# Patient Record
Sex: MALE | Race: WHITE | NOT HISPANIC OR LATINO | Employment: UNEMPLOYED | URBAN - METROPOLITAN AREA
[De-identification: names, ages, dates, MRNs, and addresses within clinical notes are randomized per-mention and may not be internally consistent; named-entity substitution may affect disease eponyms.]

---
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
## 2022-01-03 PROBLEM — R45.851 SUICIDAL IDEATION: Status: ACTIVE | Noted: 2022-01-03

## 2022-01-03 PROBLEM — G40.901 STATUS EPILEPTICUS: Status: ACTIVE | Noted: 2022-01-03

## 2022-01-03 PROBLEM — L81.8 TATTOO OF SKIN: Status: ACTIVE | Noted: 2022-01-03

## 2022-01-03 PROBLEM — G93.40 ACUTE ENCEPHALOPATHY: Status: ACTIVE | Noted: 2022-01-03

## 2022-01-03 PROBLEM — G40.909 SEIZURE DISORDER: Status: ACTIVE | Noted: 2022-01-03

## 2022-01-03 PROBLEM — R06.03 RESPIRATORY DISTRESS: Status: ACTIVE | Noted: 2022-01-03

## 2022-01-03 PROBLEM — D64.9 NORMOCYTIC ANEMIA: Status: ACTIVE | Noted: 2022-01-03

## 2022-01-05 PROBLEM — F44.5 PSEUDOSEIZURES: Status: ACTIVE | Noted: 2022-01-05

## 2022-02-21 PROBLEM — Z13.9 ENCOUNTER FOR MEDICAL SCREENING EXAMINATION: Status: RESOLVED | Noted: 2021-11-21 | Resolved: 2022-02-21

## 2022-02-23 ENCOUNTER — HOSPITAL ENCOUNTER (EMERGENCY)
Facility: HOSPITAL | Age: 22
Discharge: HOME | End: 2022-02-24
Attending: EMERGENCY MEDICINE
Payer: MEDICAID

## 2022-02-23 DIAGNOSIS — F12.10 MARIJUANA ABUSE: ICD-10-CM

## 2022-02-23 DIAGNOSIS — R56.9 SEIZURE: Primary | ICD-10-CM

## 2022-02-23 DIAGNOSIS — Z91.199 NON-COMPLIANCE: ICD-10-CM

## 2022-02-23 DIAGNOSIS — F14.10 COCAINE ABUSE: ICD-10-CM

## 2022-02-23 LAB
ALBUMIN SERPL BCP-MCNC: 4.1 G/DL (ref 3.5–5.2)
ALP SERPL-CCNC: 87 U/L (ref 55–135)
ALT SERPL W/O P-5'-P-CCNC: 6 U/L (ref 10–44)
AMPHET+METHAMPHET UR QL: NEGATIVE
ANION GAP SERPL CALC-SCNC: 12 MMOL/L (ref 8–16)
AST SERPL-CCNC: 15 U/L (ref 10–40)
BARBITURATES UR QL SCN>200 NG/ML: NEGATIVE
BASOPHILS # BLD AUTO: 0.01 K/UL (ref 0–0.2)
BASOPHILS NFR BLD: 0.2 % (ref 0–1.9)
BENZODIAZ UR QL SCN>200 NG/ML: NEGATIVE
BILIRUB SERPL-MCNC: 0.5 MG/DL (ref 0.1–1)
BUN SERPL-MCNC: 11 MG/DL (ref 6–20)
BZE UR QL SCN: ABNORMAL
CALCIUM SERPL-MCNC: 9.5 MG/DL (ref 8.7–10.5)
CANNABINOIDS UR QL SCN: ABNORMAL
CHLORIDE SERPL-SCNC: 106 MMOL/L (ref 95–110)
CO2 SERPL-SCNC: 23 MMOL/L (ref 23–29)
CREAT SERPL-MCNC: 0.9 MG/DL (ref 0.5–1.4)
CREAT UR-MCNC: 219.2 MG/DL (ref 23–375)
DIFFERENTIAL METHOD: NORMAL
EOSINOPHIL # BLD AUTO: 0.2 K/UL (ref 0–0.5)
EOSINOPHIL NFR BLD: 2.6 % (ref 0–8)
ERYTHROCYTE [DISTWIDTH] IN BLOOD BY AUTOMATED COUNT: 12 % (ref 11.5–14.5)
EST. GFR  (AFRICAN AMERICAN): >60 ML/MIN/1.73 M^2
EST. GFR  (NON AFRICAN AMERICAN): >60 ML/MIN/1.73 M^2
GLUCOSE SERPL-MCNC: 82 MG/DL (ref 70–110)
HCT VFR BLD AUTO: 41.9 % (ref 40–54)
HGB BLD-MCNC: 14.3 G/DL (ref 14–18)
IMM GRANULOCYTES # BLD AUTO: 0.01 K/UL (ref 0–0.04)
IMM GRANULOCYTES NFR BLD AUTO: 0.2 % (ref 0–0.5)
LYMPHOCYTES # BLD AUTO: 2.2 K/UL (ref 1–4.8)
LYMPHOCYTES NFR BLD: 38.1 % (ref 18–48)
MCH RBC QN AUTO: 29.4 PG (ref 27–31)
MCHC RBC AUTO-ENTMCNC: 34.1 G/DL (ref 32–36)
MCV RBC AUTO: 86 FL (ref 82–98)
METHADONE UR QL SCN>300 NG/ML: NEGATIVE
MONOCYTES # BLD AUTO: 0.5 K/UL (ref 0.3–1)
MONOCYTES NFR BLD: 8.9 % (ref 4–15)
NEUTROPHILS # BLD AUTO: 2.9 K/UL (ref 1.8–7.7)
NEUTROPHILS NFR BLD: 50 % (ref 38–73)
NRBC BLD-RTO: 0 /100 WBC
OPIATES UR QL SCN: NEGATIVE
PCP UR QL SCN>25 NG/ML: NEGATIVE
PLATELET # BLD AUTO: 216 K/UL (ref 150–450)
PMV BLD AUTO: 10 FL (ref 9.2–12.9)
POTASSIUM SERPL-SCNC: 4.2 MMOL/L (ref 3.5–5.1)
PROT SERPL-MCNC: 7 G/DL (ref 6–8.4)
RBC # BLD AUTO: 4.86 M/UL (ref 4.6–6.2)
SODIUM SERPL-SCNC: 141 MMOL/L (ref 136–145)
TOXICOLOGY INFORMATION: ABNORMAL
WBC # BLD AUTO: 5.85 K/UL (ref 3.9–12.7)

## 2022-02-23 PROCEDURE — 96374 THER/PROPH/DIAG INJ IV PUSH: CPT

## 2022-02-23 PROCEDURE — 80307 DRUG TEST PRSMV CHEM ANLYZR: CPT | Performed by: NURSE PRACTITIONER

## 2022-02-23 PROCEDURE — 25000003 PHARM REV CODE 250: Performed by: EMERGENCY MEDICINE

## 2022-02-23 PROCEDURE — 99284 EMERGENCY DEPT VISIT MOD MDM: CPT | Mod: 25

## 2022-02-23 PROCEDURE — 63600175 PHARM REV CODE 636 W HCPCS: Performed by: EMERGENCY MEDICINE

## 2022-02-23 PROCEDURE — 85025 COMPLETE CBC W/AUTO DIFF WBC: CPT | Performed by: NURSE PRACTITIONER

## 2022-02-23 PROCEDURE — 80053 COMPREHEN METABOLIC PANEL: CPT | Performed by: NURSE PRACTITIONER

## 2022-02-23 RX ORDER — LORAZEPAM 2 MG/ML
1 INJECTION INTRAMUSCULAR
Status: COMPLETED | OUTPATIENT
Start: 2022-02-23 | End: 2022-02-23

## 2022-02-23 RX ORDER — OXCARBAZEPINE 150 MG/1
300 TABLET, FILM COATED ORAL
Status: COMPLETED | OUTPATIENT
Start: 2022-02-23 | End: 2022-02-23

## 2022-02-23 RX ORDER — LACOSAMIDE 100 MG/1
100 TABLET ORAL EVERY 12 HOURS
Qty: 60 TABLET | Refills: 1 | Status: ON HOLD | OUTPATIENT
Start: 2022-02-23 | End: 2022-10-24 | Stop reason: HOSPADM

## 2022-02-23 RX ORDER — OXCARBAZEPINE 300 MG/1
300 TABLET, FILM COATED ORAL 2 TIMES DAILY
Qty: 60 TABLET | Refills: 1 | Status: ON HOLD | OUTPATIENT
Start: 2022-02-23 | End: 2022-10-24 | Stop reason: SDUPTHER

## 2022-02-23 RX ORDER — LACOSAMIDE 50 MG/1
100 TABLET ORAL
Status: COMPLETED | OUTPATIENT
Start: 2022-02-23 | End: 2022-02-23

## 2022-02-23 RX ADMIN — OXCARBAZEPINE 300 MG: 150 TABLET, FILM COATED ORAL at 07:02

## 2022-02-23 RX ADMIN — LACOSAMIDE 100 MG: 50 TABLET, FILM COATED ORAL at 07:02

## 2022-02-23 RX ADMIN — LORAZEPAM 1 MG: 2 INJECTION INTRAMUSCULAR; INTRAVENOUS at 07:02

## 2022-02-24 VITALS
SYSTOLIC BLOOD PRESSURE: 128 MMHG | BODY MASS INDEX: 21.8 KG/M2 | OXYGEN SATURATION: 96 % | DIASTOLIC BLOOD PRESSURE: 78 MMHG | HEART RATE: 70 BPM | TEMPERATURE: 98 F | HEIGHT: 72 IN | RESPIRATION RATE: 20 BRPM

## 2022-02-24 NOTE — ED PROVIDER NOTES
SCRIBE #1 NOTE: I, Kristopher Das, am scribing for, and in the presence of, Stanley Etienne Jr., MD. I have scribed the entire note.       History     Chief Complaint   Patient presents with    Seizures     Patient states he had hx seizures but has not been taking his meds for an unknown amount of time. States he had a seizure tonight but unsure of details - was dropped off. C/o headache and nausea. Says last time he had a seizure he was intubated.      Review of patient's allergies indicates:   Allergen Reactions    D and c red no.7 Rash    [place] Rash         History of Present Illness     HPI    2/23/2022, 7:30 PM  History obtained from the patient      History of Present Illness: Jerardo Middleton is a 21 y.o. male patient with a PMHx of seizures and drug abuse who presents to the Emergency Department for evaluation of seizure which onset suddenly tonight. Pt admits to being out of his seizure medications for an unknown amount of time. Symptoms are intermittent and moderate in severity. No mitigating or exacerbating factors reported. Associated sxs include fatigue, HA, and nausea. Patient denies any V/D, dizziness, light-headedness, numbness, weakness, SOB, and all other sxs at this time. No prior Tx reported. No further complaints or concerns at this time.       Arrival mode: Personal vehicle    PCP: Primary Doctor No        Past Medical History:  Past Medical History:   Diagnosis Date    Asthma     Bipolar disorder     Colitis     Drug abuse     Amphetamines and cannabis    Fetal alcohol syndrome     Foster child 2020    Impulse control disorder        Past Surgical History:  No past surgical history on file.      Family History:  No family history on file.    Social History:  Social History     Tobacco Use    Smoking status: Never Smoker    Smokeless tobacco: Never Used   Substance and Sexual Activity    Alcohol use: No    Drug use: Never    Sexual activity: Yes     Partners: Female         Review of Systems     Review of Systems   Constitutional: Positive for fatigue. Negative for fever.   HENT: Negative for sore throat.    Respiratory: Negative for shortness of breath.    Cardiovascular: Negative for chest pain.   Gastrointestinal: Positive for nausea. Negative for diarrhea and vomiting.   Genitourinary: Negative for dysuria.   Musculoskeletal: Negative for back pain.   Skin: Negative for rash.   Neurological: Positive for seizures and headaches. Negative for dizziness, weakness, light-headedness and numbness.   Hematological: Does not bruise/bleed easily.   All other systems reviewed and are negative.       Physical Exam     Initial Vitals [02/23/22 1919]   BP Pulse Resp Temp SpO2   124/89 85 (!) 22 97.9 °F (36.6 °C) 98 %      MAP       --          Physical Exam  Nursing Notes and Vital Signs Reviewed.    Constitutional: Patient is in no acute distress. Well-developed and well-nourished.  Head: Atraumatic. Normocephalic.  Eyes:  EOM intact.  No scleral icterus.  ENT: Mucous membranes are moist.  Nares clear   Neck:  Full ROM. No JVD.  Cardiovascular: Regular rate. Regular rhythm No murmurs, rubs, or gallops. Distal pulses are 2+ and symmetric  Pulmonary/Chest: No respiratory distress. Clear to auscultation bilaterally. No wheezing or rales.  Equal chest wall rise bilaterally  Abdominal: Soft and non-distended.  There is no tenderness.  No rebound, guarding, or rigidity. Good bowel sounds.  Genitourinary: No CVA tenderness.  No suprapubic tenderness  Musculoskeletal: Moves all extremities. No obvious deformities.  5 x 5 strength in all extremities   Skin: Warm and dry.  Neurological:  Alert, awake, and appropriate.  Normal speech.  No acute focal neurological deficits are appreciated.  Two through 12 intact bilaterally.  Psychiatric: Normal affect. Good eye contact. Appropriate in content.     ED Course   Procedures  ED Vital Signs:  Vitals:    02/23/22 1919 02/23/22 1937 02/23/22 1938   BP: 124/89   134/84   Pulse: 85 68 67   Resp: (!) 22  14   Temp: 97.9 °F (36.6 °C)     TempSrc: Oral     SpO2: 98%  95%   Height: 6' (1.829 m)         Abnormal Lab Results:  Labs Reviewed   COMPREHENSIVE METABOLIC PANEL - Abnormal; Notable for the following components:       Result Value    ALT 6 (*)     All other components within normal limits   DRUG SCREEN PANEL, URINE EMERGENCY - Abnormal; Notable for the following components:    Cocaine (Metab.) Presumptive Positive (*)     THC Presumptive Positive (*)     All other components within normal limits    Narrative:     Specimen Source->Urine   CBC W/ AUTO DIFFERENTIAL        All Lab Results:  Results for orders placed or performed during the hospital encounter of 02/23/22   CBC auto differential   Result Value Ref Range    WBC 5.85 3.90 - 12.70 K/uL    RBC 4.86 4.60 - 6.20 M/uL    Hemoglobin 14.3 14.0 - 18.0 g/dL    Hematocrit 41.9 40.0 - 54.0 %    MCV 86 82 - 98 fL    MCH 29.4 27.0 - 31.0 pg    MCHC 34.1 32.0 - 36.0 g/dL    RDW 12.0 11.5 - 14.5 %    Platelets 216 150 - 450 K/uL    MPV 10.0 9.2 - 12.9 fL    Immature Granulocytes 0.2 0.0 - 0.5 %    Gran # (ANC) 2.9 1.8 - 7.7 K/uL    Immature Grans (Abs) 0.01 0.00 - 0.04 K/uL    Lymph # 2.2 1.0 - 4.8 K/uL    Mono # 0.5 0.3 - 1.0 K/uL    Eos # 0.2 0.0 - 0.5 K/uL    Baso # 0.01 0.00 - 0.20 K/uL    nRBC 0 0 /100 WBC    Gran % 50.0 38.0 - 73.0 %    Lymph % 38.1 18.0 - 48.0 %    Mono % 8.9 4.0 - 15.0 %    Eosinophil % 2.6 0.0 - 8.0 %    Basophil % 0.2 0.0 - 1.9 %    Differential Method Automated    Comprehensive metabolic panel   Result Value Ref Range    Sodium 141 136 - 145 mmol/L    Potassium 4.2 3.5 - 5.1 mmol/L    Chloride 106 95 - 110 mmol/L    CO2 23 23 - 29 mmol/L    Glucose 82 70 - 110 mg/dL    BUN 11 6 - 20 mg/dL    Creatinine 0.9 0.5 - 1.4 mg/dL    Calcium 9.5 8.7 - 10.5 mg/dL    Total Protein 7.0 6.0 - 8.4 g/dL    Albumin 4.1 3.5 - 5.2 g/dL    Total Bilirubin 0.5 0.1 - 1.0 mg/dL    Alkaline Phosphatase 87 55 - 135 U/L     AST 15 10 - 40 U/L    ALT 6 (L) 10 - 44 U/L    Anion Gap 12 8 - 16 mmol/L    eGFR if African American >60 >60 mL/min/1.73 m^2    eGFR if non African American >60 >60 mL/min/1.73 m^2   Drug screen panel, emergency   Result Value Ref Range    Benzodiazepines Negative Negative    Methadone metabolites Negative Negative    Cocaine (Metab.) Presumptive Positive (A) Negative    Opiate Scrn, Ur Negative Negative    Barbiturate Screen, Ur Negative Negative    Amphetamine Screen, Ur Negative Negative    THC Presumptive Positive (A) Negative    Phencyclidine Negative Negative    Creatinine, Urine 219.2 23.0 - 375.0 mg/dL    Toxicology Information SEE COMMENT          Imaging Results:  Imaging Results    None               The Emergency Provider reviewed the vital signs and test results, which are outlined above.     ED Discussion     9:16 PM: Reassessed pt at this time. Discussed with pt all pertinent ED information and results. Discussed pt dx and plan of tx. Gave pt all f/u and return to the ED instructions. All questions and concerns were addressed at this time. Pt expresses understanding of information and instructions, and is comfortable with plan to discharge. Pt is stable for discharge.    I discussed with patient and/or family/caretaker that evaluation in the ED does not suggest any emergent or life threatening medical conditions requiring immediate intervention beyond what was provided in the ED, and I believe patient is safe for discharge.  Regardless, an unremarkable evaluation in the ED does not preclude the development or presence of a serious of life threatening condition. As such, patient was instructed to return immediately for any worsening or change in current symptoms.      Patient is stable nontoxic.  Nonfocal neurological examination.  Has seizure prior to arrival but no injuries noted on exam.  He has been noncompliant with his medication and abusing drugs as evidence by his UDS.  I have refilled his  medications, provided outpatient referrals for detox etc.  and provided referral to the [facility] for him to establish primary care to have his medications refilled.  Patient verbalizes understanding.  He is safe for discharge in my opinion    Medical Decision Making:   Clinical Tests:   Lab Tests: Ordered and Reviewed           ED Medication(s):  Medications   lorazepam injection 1 mg (1 mg Intravenous Given 2/23/22 1940)   OXcarbazepine tablet 300 mg (300 mg Oral Given 2/23/22 1958)   lacosamide tablet 100 mg (100 mg Oral Given 2/23/22 1958)       Current Discharge Medication List           Follow-up Information     [facility].    Specialty: Urgent Care  Contact information:  [address]  [place] [address] [phone]                             Scribe Attestation:   Scribe #1: I performed the above scribed service and the documentation accurately describes the services I performed. I attest to the accuracy of the note.     Attending:   Physician Attestation Statement for Scribe #1: I, Stanley Etienne Jr., MD, personally performed the services described in this documentation, as scribed by Kristopher Das, in my presence, and it is both accurate and complete.           Clinical Impression       ICD-10-CM ICD-9-CM   1. Seizure  R56.9 780.39   2. Non-compliance  Z91.19 V15.81   3. Cocaine abuse  F14.10 305.60   4. Marijuana abuse  F12.10 305.20       Disposition:   Disposition: Discharged  Condition: Stable         Stanley Etienne Jr., MD  02/23/22 2120

## 2022-02-24 NOTE — ED NOTES
Received patient laying in stretcher with c/o of possibly seizure symptoms. Patient is AAox2 oriented to self and age only, disoriented to day. Mumble speech, unsteady gaits at time of wheelchair transfer. Vss, afebrile, all orders acknowledged at this time. Awaiting patients urine for further evaluation. Will continue to monitor.

## 2022-02-24 NOTE — FIRST PROVIDER EVALUATION
Medical screening exam completed.  I have conducted a focused provider triage encounter, findings are as follows:    Brief history of present illness:  Pt presents after having a seizure today.  Pt is a poor historian and is unsure when he last took his seizure medication.    There were no vitals filed for this visit.    Pertinent physical exam:      Brief workup plan:      Preliminary workup initiated; this workup will be continued and followed by the physician or advanced practice provider that is assigned to the patient when roomed.

## 2022-02-24 NOTE — DISCHARGE INSTRUCTIONS
Please put down the illicit drugs and  her prescription drugs.  This will help you to survive longer treat her seizures much better than cocaine and marijuana.  Please follow-up with the [facility] to establish primary care.  They can refill your medications and assist you in getting into rehab as needed.  I will provide as well information for you on detox centers and rehabilitation centers here locally.  Have a good day.

## 2022-09-07 ENCOUNTER — HOSPITAL ENCOUNTER (EMERGENCY)
Facility: HOSPITAL | Age: 22
Discharge: HOME | End: 2022-09-07
Attending: INTERNAL MEDICINE
Payer: MEDICAID

## 2022-09-07 ENCOUNTER — HOSPITAL ENCOUNTER (EMERGENCY)
Facility: HOSPITAL | Age: 22
Discharge: PSYCHIATRIC OR SUBSTANCE USE FACILITY | End: 2022-09-08
Attending: STUDENT IN AN ORGANIZED HEALTH CARE EDUCATION/TRAINING PROGRAM
Payer: MEDICAID

## 2022-09-07 VITALS
DIASTOLIC BLOOD PRESSURE: 86 MMHG | TEMPERATURE: 98 F | RESPIRATION RATE: 20 BRPM | HEART RATE: 71 BPM | SYSTOLIC BLOOD PRESSURE: 127 MMHG | OXYGEN SATURATION: 100 %

## 2022-09-07 VITALS
DIASTOLIC BLOOD PRESSURE: 70 MMHG | TEMPERATURE: 98 F | SYSTOLIC BLOOD PRESSURE: 116 MMHG | HEART RATE: 76 BPM | OXYGEN SATURATION: 100 % | RESPIRATION RATE: 20 BRPM

## 2022-09-07 DIAGNOSIS — T14.91XA SUICIDAL BEHAVIOR WITH ATTEMPTED SELF-INJURY: ICD-10-CM

## 2022-09-07 DIAGNOSIS — R46.89 MANIPULATIVE BEHAVIOR: Primary | ICD-10-CM

## 2022-09-07 DIAGNOSIS — F15.10 METHAMPHETAMINE ABUSE: Primary | ICD-10-CM

## 2022-09-07 DIAGNOSIS — R45.851 SUICIDAL IDEATION: Primary | ICD-10-CM

## 2022-09-07 DIAGNOSIS — F31.9 BIPOLAR 1 DISORDER: ICD-10-CM

## 2022-09-07 DIAGNOSIS — Z00.8 MEDICAL CLEARANCE FOR PSYCHIATRIC ADMISSION: ICD-10-CM

## 2022-09-07 LAB
ALBUMIN SERPL-MCNC: 3.6 GM/DL (ref 3.5–5)
ALBUMIN/GLOB SERPL: 1.3 RATIO (ref 1.1–2)
ALP SERPL-CCNC: 68 UNIT/L (ref 40–150)
ALT SERPL-CCNC: 12 UNIT/L (ref 0–55)
APAP SERPL-MCNC: <17.4 UG/ML (ref 17.4–30)
APPEARANCE UR: CLEAR
AST SERPL-CCNC: 12 UNIT/L (ref 5–34)
BACTERIA #/AREA URNS AUTO: NORMAL /HPF
BASOPHILS # BLD AUTO: 0.02 X10(3)/MCL (ref 0–0.2)
BASOPHILS NFR BLD AUTO: 0.4 %
BILIRUB UR QL STRIP.AUTO: NEGATIVE MG/DL
BILIRUBIN DIRECT+TOT PNL SERPL-MCNC: 0.3 MG/DL
BUN SERPL-MCNC: 7.9 MG/DL (ref 8.9–20.6)
CALCIUM SERPL-MCNC: 9 MG/DL (ref 8.4–10.2)
CHLORIDE SERPL-SCNC: 109 MMOL/L (ref 98–107)
CO2 SERPL-SCNC: 30 MMOL/L (ref 22–29)
COLOR UR AUTO: YELLOW
CREAT SERPL-MCNC: 0.83 MG/DL (ref 0.73–1.18)
EOSINOPHIL # BLD AUTO: 0.13 X10(3)/MCL (ref 0–0.9)
EOSINOPHIL NFR BLD AUTO: 2.3 %
ERYTHROCYTE [DISTWIDTH] IN BLOOD BY AUTOMATED COUNT: 13.8 % (ref 11.5–17)
ETHANOL SERPL-MCNC: <10 MG/DL
GFR SERPLBLD CREATININE-BSD FMLA CKD-EPI: >60 MLS/MIN/1.73/M2
GLOBULIN SER-MCNC: 2.8 GM/DL (ref 2.4–3.5)
GLUCOSE SERPL-MCNC: 74 MG/DL (ref 74–100)
GLUCOSE UR QL STRIP.AUTO: NEGATIVE MG/DL
HCT VFR BLD AUTO: 37.2 % (ref 42–52)
HGB BLD-MCNC: 11.9 GM/DL (ref 14–18)
IMM GRANULOCYTES # BLD AUTO: 0.01 X10(3)/MCL (ref 0–0.04)
IMM GRANULOCYTES NFR BLD AUTO: 0.2 %
KETONES UR QL STRIP.AUTO: NEGATIVE MG/DL
LEUKOCYTE ESTERASE UR QL STRIP.AUTO: NEGATIVE UNIT/L
LYMPHOCYTES # BLD AUTO: 1.51 X10(3)/MCL (ref 0.6–4.6)
LYMPHOCYTES NFR BLD AUTO: 26.5 %
MCH RBC QN AUTO: 29.3 PG (ref 27–31)
MCHC RBC AUTO-ENTMCNC: 32 MG/DL (ref 33–36)
MCV RBC AUTO: 91.6 FL (ref 80–94)
MONOCYTES # BLD AUTO: 0.45 X10(3)/MCL (ref 0.1–1.3)
MONOCYTES NFR BLD AUTO: 7.9 %
NEUTROPHILS # BLD AUTO: 3.6 X10(3)/MCL (ref 2.1–9.2)
NEUTROPHILS NFR BLD AUTO: 62.7 %
NITRITE UR QL STRIP.AUTO: NEGATIVE
NRBC BLD AUTO-RTO: 0 %
PH UR STRIP.AUTO: 7 [PH]
PLATELET # BLD AUTO: 256 X10(3)/MCL (ref 130–400)
PMV BLD AUTO: 10.4 FL (ref 7.4–10.4)
POTASSIUM SERPL-SCNC: 3.6 MMOL/L (ref 3.5–5.1)
PROT SERPL-MCNC: 6.4 GM/DL (ref 6.4–8.3)
PROT UR QL STRIP.AUTO: ABNORMAL MG/DL
RBC # BLD AUTO: 4.06 X10(6)/MCL (ref 4.7–6.1)
RBC #/AREA URNS AUTO: <5 /HPF
RBC UR QL AUTO: NEGATIVE UNIT/L
SODIUM SERPL-SCNC: 140 MMOL/L (ref 136–145)
SP GR UR STRIP.AUTO: 1.02 (ref 1–1.03)
SQUAMOUS #/AREA URNS AUTO: <5 /HPF
UROBILINOGEN UR STRIP-ACNC: 1 MG/DL
WBC # SPEC AUTO: 5.7 X10(3)/MCL (ref 4.5–11.5)
WBC #/AREA URNS AUTO: <5 /HPF

## 2022-09-07 PROCEDURE — 84443 ASSAY THYROID STIM HORMONE: CPT | Performed by: STUDENT IN AN ORGANIZED HEALTH CARE EDUCATION/TRAINING PROGRAM

## 2022-09-07 PROCEDURE — 99283 EMERGENCY DEPT VISIT LOW MDM: CPT | Mod: 25,27

## 2022-09-07 PROCEDURE — 99285 EMERGENCY DEPT VISIT HI MDM: CPT | Mod: 25

## 2022-09-07 PROCEDURE — 99281 EMR DPT VST MAYX REQ PHY/QHP: CPT | Mod: 27

## 2022-09-07 PROCEDURE — 82077 ASSAY SPEC XCP UR&BREATH IA: CPT | Performed by: STUDENT IN AN ORGANIZED HEALTH CARE EDUCATION/TRAINING PROGRAM

## 2022-09-07 PROCEDURE — 87636 SARSCOV2 & INF A&B AMP PRB: CPT | Performed by: STUDENT IN AN ORGANIZED HEALTH CARE EDUCATION/TRAINING PROGRAM

## 2022-09-07 PROCEDURE — 80307 DRUG TEST PRSMV CHEM ANLYZR: CPT | Performed by: STUDENT IN AN ORGANIZED HEALTH CARE EDUCATION/TRAINING PROGRAM

## 2022-09-07 PROCEDURE — 25000003 PHARM REV CODE 250: Performed by: STUDENT IN AN ORGANIZED HEALTH CARE EDUCATION/TRAINING PROGRAM

## 2022-09-07 PROCEDURE — 80143 DRUG ASSAY ACETAMINOPHEN: CPT | Performed by: STUDENT IN AN ORGANIZED HEALTH CARE EDUCATION/TRAINING PROGRAM

## 2022-09-07 PROCEDURE — 36415 COLL VENOUS BLD VENIPUNCTURE: CPT | Performed by: STUDENT IN AN ORGANIZED HEALTH CARE EDUCATION/TRAINING PROGRAM

## 2022-09-07 PROCEDURE — 80053 COMPREHEN METABOLIC PANEL: CPT | Performed by: STUDENT IN AN ORGANIZED HEALTH CARE EDUCATION/TRAINING PROGRAM

## 2022-09-07 PROCEDURE — 81001 URINALYSIS AUTO W/SCOPE: CPT | Performed by: STUDENT IN AN ORGANIZED HEALTH CARE EDUCATION/TRAINING PROGRAM

## 2022-09-07 PROCEDURE — 85025 COMPLETE CBC W/AUTO DIFF WBC: CPT | Performed by: STUDENT IN AN ORGANIZED HEALTH CARE EDUCATION/TRAINING PROGRAM

## 2022-09-07 RX ORDER — OLANZAPINE 5 MG/1
10 TABLET ORAL
Status: COMPLETED | OUTPATIENT
Start: 2022-09-07 | End: 2022-09-07

## 2022-09-07 RX ADMIN — OLANZAPINE 10 MG: 5 TABLET, FILM COATED ORAL at 11:09

## 2022-09-08 VITALS
HEART RATE: 60 BPM | HEIGHT: 74 IN | BODY MASS INDEX: 16.04 KG/M2 | SYSTOLIC BLOOD PRESSURE: 122 MMHG | RESPIRATION RATE: 18 BRPM | OXYGEN SATURATION: 99 % | WEIGHT: 125 LBS | TEMPERATURE: 98 F | DIASTOLIC BLOOD PRESSURE: 71 MMHG

## 2022-09-08 LAB
AMPHET UR QL SCN: POSITIVE
BARBITURATE SCN PRESENT UR: NEGATIVE
BENZODIAZ UR QL SCN: NEGATIVE
CANNABINOIDS UR QL SCN: POSITIVE
COCAINE UR QL SCN: NEGATIVE
FENTANYL UR QL SCN: NEGATIVE
FLUAV AG UPPER RESP QL IA.RAPID: NOT DETECTED
FLUBV AG UPPER RESP QL IA.RAPID: NOT DETECTED
MDMA UR QL SCN: NEGATIVE
OPIATES UR QL SCN: NEGATIVE
PCP UR QL: NEGATIVE
PH UR: 7 [PH] (ref 3–11)
SARS-COV-2 RNA RESP QL NAA+PROBE: NOT DETECTED
SPECIFIC GRAVITY, URINE AUTO (.000) (OHS): 1.02 (ref 1–1.03)
TSH SERPL-ACNC: 0.31 UIU/ML (ref 0.35–4.94)

## 2022-09-08 RX ORDER — DIPHENHYDRAMINE HCL 25 MG
50 CAPSULE ORAL EVERY 4 HOURS PRN
Status: DISCONTINUED | OUTPATIENT
Start: 2022-09-08 | End: 2022-09-08 | Stop reason: HOSPADM

## 2022-09-08 RX ORDER — ZIPRASIDONE MESYLATE 20 MG/ML
20 INJECTION, POWDER, LYOPHILIZED, FOR SOLUTION INTRAMUSCULAR EVERY 8 HOURS PRN
Status: DISCONTINUED | OUTPATIENT
Start: 2022-09-08 | End: 2022-09-08 | Stop reason: HOSPADM

## 2022-09-08 RX ORDER — HALOPERIDOL 5 MG/ML
5 INJECTION INTRAMUSCULAR EVERY 4 HOURS PRN
Status: DISCONTINUED | OUTPATIENT
Start: 2022-09-08 | End: 2022-09-08 | Stop reason: HOSPADM

## 2022-09-08 RX ORDER — LORAZEPAM 1 MG/1
2 TABLET ORAL EVERY 4 HOURS PRN
Status: DISCONTINUED | OUTPATIENT
Start: 2022-09-08 | End: 2022-09-08 | Stop reason: HOSPADM

## 2022-09-08 RX ORDER — LORAZEPAM 1 MG/1
2 TABLET ORAL EVERY 8 HOURS PRN
Status: DISCONTINUED | OUTPATIENT
Start: 2022-09-08 | End: 2022-09-08 | Stop reason: HOSPADM

## 2022-09-08 RX ORDER — OLANZAPINE 5 MG/1
10 TABLET, ORALLY DISINTEGRATING ORAL EVERY 8 HOURS PRN
Status: DISCONTINUED | OUTPATIENT
Start: 2022-09-08 | End: 2022-09-08 | Stop reason: HOSPADM

## 2022-09-08 RX ORDER — DIPHENHYDRAMINE HYDROCHLORIDE 50 MG/ML
50 INJECTION INTRAMUSCULAR; INTRAVENOUS EVERY 4 HOURS PRN
Status: DISCONTINUED | OUTPATIENT
Start: 2022-09-08 | End: 2022-09-08 | Stop reason: HOSPADM

## 2022-09-08 NOTE — ED PROVIDER NOTES
Encounter Date: 9/7/2022       History     Chief Complaint   Patient presents with    Depression     Patient  wants  detox  from  methamphetamines     Presents requesting placement in rehabilitation due to methamphetamine abuse. States was living with his mother who kick him out of the house due to his addiction and now have no place to go. Went to rehab last year and will like to go back. Denies SI, HI or hallucinations    The history is provided by the patient and the EMS personnel.   Review of patient's allergies indicates:   Allergen Reactions    D and c red no.7 Rash    [place] Rash     Past Medical History:   Diagnosis Date    Asthma     Bipolar disorder     Colitis     Drug abuse     Amphetamines and cannabis    Fetal alcohol syndrome     Foster child 2020    Impulse control disorder      No past surgical history on file.  No family history on file.  Social History     Tobacco Use    Smoking status: Never    Smokeless tobacco: Never   Substance Use Topics    Alcohol use: No    Drug use: Never     Review of Systems   Constitutional:  Negative for fever.   HENT:  Negative for sore throat.    Respiratory:  Negative for shortness of breath.    Cardiovascular:  Negative for chest pain.   Gastrointestinal:  Negative for nausea.   Genitourinary:  Negative for dysuria.   Musculoskeletal:  Negative for back pain.   Skin:  Negative for rash.   Neurological:  Negative for weakness.   Hematological:  Does not bruise/bleed easily.   Psychiatric/Behavioral:  Negative for hallucinations and suicidal ideas. The patient is nervous/anxious.    All other systems reviewed and are negative.    Physical Exam     Initial Vitals [09/07/22 2110]   BP Pulse Resp Temp SpO2   127/86 71 20 98.2 °F (36.8 °C) 100 %      MAP       --         Physical Exam    Nursing note and vitals reviewed.  Constitutional: He appears well-developed and well-nourished. No distress.   HENT:   Head: Normocephalic and atraumatic.   Mouth/Throat: Oropharynx  is clear and moist.   Eyes: Conjunctivae and EOM are normal. Pupils are equal, round, and reactive to light.   Neck: Neck supple.   Normal range of motion.  Cardiovascular:  Regular rhythm, normal heart sounds and intact distal pulses.           Pulmonary/Chest: Breath sounds normal. No respiratory distress.   Abdominal: Abdomen is soft. Bowel sounds are normal. He exhibits no distension. There is no abdominal tenderness. There is no rebound and no guarding.   Musculoskeletal:         General: No edema. Normal range of motion.      Cervical back: Normal range of motion and neck supple.     Neurological: He is alert and oriented to person, place, and time. He has normal strength. GCS score is 15. GCS eye subscore is 4. GCS verbal subscore is 5. GCS motor subscore is 6.   Skin: Skin is warm and dry. No rash noted.   Psychiatric: Thought content normal. His mood appears anxious. His speech is not rapid and/or pressured, not delayed and not tangential. He is not hyperactive, not actively hallucinating and not combative. Thought content is not paranoid and not delusional. Cognition and memory are normal. He expresses impulsivity. He exhibits a depressed mood. He expresses no homicidal and no suicidal ideation. He expresses no suicidal plans and no homicidal plans. He is communicative. He is attentive.       ED Course   Procedures  Labs Reviewed - No data to display       Imaging Results    None          Medications - No data to display                       Clinical Impression:   Final diagnoses:  [F15.10] Methamphetamine abuse (Primary)      ED Disposition Condition    Discharge Stable          ED Prescriptions    None       Follow-up Information       Follow up With Specialties Details Why Contact Info    [facility] - Emergency Dept Emergency Medicine  If symptoms worsen [address] [facility] [address] [phone]    [facility] Behavioral Health, Psychiatry, Psychology In 1  week  [address] [address]  [phone]               Raul Douglass MD  09/07/22 2127

## 2022-09-08 NOTE — ED PROVIDER NOTES
"Encounter Date: 9/7/2022    SCRIBE #1 NOTE: I, Brooks Levine, am scribing for, and in the presence of,  John Maria IV, MD. I have scribed the following portions of the note - Other sections scribed: HPI, ROS, physical exam.     History     Chief Complaint   Patient presents with    Suicidal     Released from [facility]. Found running into traffic. SI.      21 y/o male with a history of seizures, bipolar disorder, substance abuse, schizophrenia, and FAS presenting to the ED via EMS for SI and an apparent suicide attempt by jumping into traffic. Pt states he ran into traffic because he "wants to fucking die." When asked how long he has felt this way pt states "all his fucking life" but he does state it has been worse lately. Pt denies any AVH but does endorse some HI, stating he wants to hurt "anyone who doesn't give a fuck about him." Pt states he has been off of his psych meds since the beginning of the year. He states he has been admitted to a psych facility before. Pt also states he attempted to shoot himself 2 weeks ago but was prevented from doing so by the police. Pt was discharged from [facility] earlier today.     PEC placed by John Maria IV, MD at 2302.    The history is provided by the patient. No  was used.   Mental Health Problem  The primary symptoms include depressed mood, homicidal ideas, suicidal ideas, suicidal threats and suicide attempt.   The degree of incapacity that he is experiencing as a consequence of his illness is moderate. Sequelae of the illness include an inability to care for self. Additional symptoms of the illness do not include abdominal pain. He admits to suicidal ideas. He does have a plan to attempt suicide. He contemplates harming himself. He has not already injured self. He contemplates injuring another person. He has not already  injured another person. Risk factors that are present for mental illness include a history of mental illness, a history of suicide attempts " and substance abuse.   Review of patient's allergies indicates:   Allergen Reactions    D and c red no.7 Rash    [place] Rash     Past Medical History:   Diagnosis Date    Asthma     Bipolar disorder     Colitis     Drug abuse     Amphetamines and cannabis    Fetal alcohol syndrome     Foster child 2020    Impulse control disorder      No past surgical history on file.  No family history on file.  Social History     Tobacco Use    Smoking status: Never    Smokeless tobacco: Never   Substance Use Topics    Alcohol use: No    Drug use: Never     Review of Systems   Constitutional:  Negative for chills and fever.   HENT:  Negative for congestion, rhinorrhea and sore throat.    Eyes:  Negative for visual disturbance.   Respiratory:  Negative for cough and shortness of breath.    Cardiovascular:  Negative for chest pain.   Gastrointestinal:  Negative for abdominal pain, nausea and vomiting.   Genitourinary:  Negative for dysuria and hematuria.   Musculoskeletal:  Negative for joint swelling.   Skin:  Negative for rash.   Neurological:  Negative for weakness.   Psychiatric/Behavioral:  Positive for homicidal ideas and suicidal ideas. Negative for confusion.    All other systems reviewed and are negative.    Physical Exam     Initial Vitals [09/07/22 2235]   BP Pulse Resp Temp SpO2   131/88 75 18 98.1 °F (36.7 °C) 99 %      MAP       --         Physical Exam    Nursing note and vitals reviewed.  Constitutional: He is not diaphoretic. No distress.   HENT:   Head: Normocephalic and atraumatic.   Eyes: EOM are normal. Pupils are equal, round, and reactive to light.   Neck: Neck supple.   Normal range of motion.  Cardiovascular:  Normal rate and regular rhythm.           No murmur heard.  Pulmonary/Chest: Breath sounds normal. No respiratory distress. He has no wheezes. He has no rales.   Abdominal: Abdomen is soft. He exhibits no distension. There is no abdominal tenderness.   Musculoskeletal:         General: No edema.  Normal range of motion.      Cervical back: Normal range of motion and neck supple.     Neurological: He is alert and oriented to person, place, and time. He has normal strength. No cranial nerve deficit.   Skin: Skin is warm. No rash noted.   Psychiatric: His affect is labile. He expresses homicidal and suicidal ideation.   Flat affect. Not responding to internal stimuli.       ED Course   Procedures  Labs Reviewed   COMPREHENSIVE METABOLIC PANEL - Abnormal; Notable for the following components:       Result Value    Chloride 109 (*)     Carbon Dioxide 30 (*)     Blood Urea Nitrogen 7.9 (*)     All other components within normal limits   TSH - Abnormal; Notable for the following components:    Thyroid Stimulating Hormone 0.3056 (*)     All other components within normal limits   URINALYSIS, REFLEX TO URINE CULTURE - Abnormal; Notable for the following components:    Protein, UA 2+ (*)     All other components within normal limits   DRUG SCREEN, URINE (BEAKER) - Abnormal; Notable for the following components:    Amphetamines, Urine Positive (*)     Cannabinoids, Urine Positive (*)     All other components within normal limits    Narrative:     Cut off concentrations:    Amphetamines - 1000 ng/ml  Barbiturates - 200 ng/ml  Benzodiazepine - 200 ng/ml  Cannabinoids (THC) - 50 ng/ml  Cocaine - 300 ng/ml  Fentanyl - 1.0 ng/ml  MDMA - 500 ng/ml  Opiates - 300 ng/ml   Phencyclidine (PCP) - 25 ng/ml    Specimen submitted for drug analysis and tested for pH and specific gravity in order to evaluate sample integrity. Suspect tampering if specific gravity is <1.003 and/or pH is not within the range of 4.5 - 8.0  False negatives may result form substances such as bleach added to urine.  False positives may result for the presence of a substance with similar chemical structure to the drug or its metabolite.    This test provides only a PRELIMINARY analytical test result. A more specific alternate chemical method must be used in  order to obtain a confirmed analytical result. Gas chromatography/mass spectrometry (GC/MS) is the preferred confirmatory method. Other chemical confirmation methods are available. Clinical consideration and professional judgement should be applied to any drug of abuse test result, particularly when preliminary positive results are used.    Positive results will be confirmed only at the physicians request. Unconfirmed screening results are to be used only for medical purposes (treatment).        ACETAMINOPHEN LEVEL - Abnormal; Notable for the following components:    Acetaminophen Level <17.4 (*)     All other components within normal limits   CBC WITH DIFFERENTIAL - Abnormal; Notable for the following components:    RBC 4.06 (*)     Hgb 11.9 (*)     Hct 37.2 (*)     MCHC 32.0 (*)     All other components within normal limits   ALCOHOL,MEDICAL (ETHANOL) - Normal   COVID/FLU A&B PCR - Normal   URINALYSIS, MICROSCOPIC - Normal   CBC W/ AUTO DIFFERENTIAL    Narrative:     The following orders were created for panel order CBC auto differential.  Procedure                               Abnormality         Status                     ---------                               -----------         ------                     CBC with Differential[[id number]]        Abnormal            Final result                 Please view results for these tests on the individual orders.   COVID/FLU A&B PCR          Imaging Results    None          Medications   ziprasidone injection 20 mg (has no administration in time range)   LORazepam tablet 2 mg (has no administration in time range)   olanzapine zydis disintegrating tablet 10 mg (has no administration in time range)   haloperidol lactate injection 5 mg (has no administration in time range)   diphenhydrAMINE injection 50 mg (has no administration in time range)   LORazepam tablet 2 mg (has no administration in time range)   diphenhydrAMINE capsule 50 mg (has no administration in time  range)   OLANZapine tablet 10 mg (10 mg Oral Given 9/7/22 3634)     Medical Decision Making:   History:   Old Medical Records: I decided to obtain old medical records.  Old Records Summarized: records from another hospital.  Initial Assessment:   22-year-old history as above seen and evaluated at [facility] earlier this evening.  Was cleared and discharged immediately ran into traffic he reports an attempt to kill himself.  Reports to me suicidal homicidal ideations for several weeks states he has been off all his medications since the beginning of the year.  Pec filed will obtain medical clearance for psychiatric transfer.   Differential Diagnosis:   SI, suicidal behavior, HI, bipolar, MDD        Scribe Attestation:   Scribe #1: I performed the above scribed service and the documentation accurately describes the services I performed. I attest to the accuracy of the note.    Attending Attestation:           Physician Attestation for Scribe:  Physician Attestation Statement for Scribe #1: I, John Maria IV, MD, reviewed documentation, as scribed by Brooks Levine in my presence, and it is both accurate and complete.           ED Course as of 09/08/22 0056   Thu Sep 08, 2022   0017 Thyroid Stimulating Hormone(!): 0.3056  Not clinically hyperthyroid [AC]   0017 Medically cleared for psychiatric evaluation  [AC]      ED Course User Index  [AC] John Maria IV, MD        Medically cleared for psychiatry placement: 9/8/2022 12:21 AM    Clinical Impression:   Final diagnoses:  [R45.851] Suicidal ideation (Primary)  [Z00.8] Medical clearance for psychiatric admission  [T14.91XA] Suicidal behavior with attempted self-injury  [F31.9] Bipolar 1 disorder      ED Disposition Condition    Transfer to Psych Facility           ED Prescriptions    None       Follow-up Information       Follow up With Specialties Details Why Contact Info    [facility] - Emergency Dept Emergency Medicine Go to  If symptoms worsen [address]  [facility] [address]  [phone]    PCP  Schedule an appointment as soon as possible for a visit           I, John Maria MD personally performed the history, PE, MDM, and procedures as documented above and agree with the scribe's documentation.        John Maria IV, MD  09/08/22 0056

## 2022-09-08 NOTE — ED PROVIDER NOTES
Encounter Date: 9/7/2022       History     Chief Complaint   Patient presents with    Suicidal     Pt was just evaluated at ED asking for rehabilitation for methamphetamine, came out ED after discharge and call 911 claiming being suicidal for which is back. Pt states he doen not have a place to go and need to go to rehab or a psychiatric hospital.     The history is provided by the patient and the EMS personnel.   Review of patient's allergies indicates:   Allergen Reactions    D and c red no.7 Rash    [place] Rash     Past Medical History:   Diagnosis Date    Asthma     Bipolar disorder     Colitis     Drug abuse     Amphetamines and cannabis    Fetal alcohol syndrome     Foster child 2020    Impulse control disorder      No past surgical history on file.  No family history on file.  Social History     Tobacco Use    Smoking status: Never    Smokeless tobacco: Never   Substance Use Topics    Alcohol use: No    Drug use: Never     Review of Systems   Constitutional:  Negative for fever.   HENT:  Negative for sore throat.    Respiratory:  Negative for shortness of breath.    Cardiovascular:  Negative for chest pain.   Gastrointestinal:  Negative for nausea.   Genitourinary:  Negative for dysuria.   Musculoskeletal:  Negative for back pain.   Skin:  Negative for rash.   Neurological:  Negative for weakness.   Hematological:  Does not bruise/bleed easily.   Psychiatric/Behavioral:  Positive for behavioral problems.    All other systems reviewed and are negative.    Physical Exam     Initial Vitals [09/07/22 2200]   BP Pulse Resp Temp SpO2   116/70 76 20 98.1 °F (36.7 °C) 100 %      MAP       --         Physical Exam    Nursing note and vitals reviewed.  Constitutional: He appears well-developed and well-nourished. No distress.   HENT:   Head: Normocephalic and atraumatic.   Eyes: Pupils are equal, round, and reactive to light.   Neck: Neck supple.   Normal range of motion.  Cardiovascular:  Regular rhythm, normal  heart sounds and intact distal pulses.           Pulmonary/Chest: Breath sounds normal. No respiratory distress.   Abdominal: Abdomen is soft. Bowel sounds are normal. He exhibits no distension. There is no abdominal tenderness. There is no rebound and no guarding.   Musculoskeletal:         General: No edema. Normal range of motion.      Cervical back: Normal range of motion and neck supple.     Neurological: He is alert and oriented to person, place, and time. He has normal strength. GCS score is 15. GCS eye subscore is 4. GCS verbal subscore is 5. GCS motor subscore is 6.   Skin: Skin is warm and dry. No rash noted.   Psychiatric: Thought content normal.   Pt belligerent, argumentative, no active psychosis, pt with manipulative behavior       ED Course   Procedures  Labs Reviewed - No data to display       Imaging Results    None          Medications - No data to display                       Clinical Impression:   Final diagnoses:  [R46.89] Manipulative behavior (Primary)      ED Disposition Condition    Discharge Stable          ED Prescriptions    None       Follow-up Information       Follow up With Specialties Details Why Contact Info    [facility] - Emergency Dept Emergency Medicine  If symptoms worsen [address] [facility] [place] [address] [phone]    [facility], Psychiatry, Psychology Schedule an appointment as soon as possible for a visit in 1 week  [address] [id number] [phone]               Raul Douglass MD  09/07/22 0525

## 2022-09-08 NOTE — ED NOTES
Pt is a 22 year old male who came here for assistance with rehab. Prior to arrival spoke with EMT and told her, we are not a rehab facility and he need to be taken somewhere else besides this hospital. He arrived here saying he need detox for meth. When told there is no deetix for meth then he reported he is on every kind of drugs there is. He stated he use cocaine, THC and anything he can get his hands on. When told he was discharge then he began to speak on not been from here and will called the police to take him to another place where he can get some help. Explain to the patient I have a list of rehab facilities but he need to make calls to these facilities  for assistance with rehab.

## 2022-09-15 ENCOUNTER — HOSPITAL ENCOUNTER (EMERGENCY)
Facility: HOSPITAL | Age: 22
Discharge: PSYCHIATRIC OR SUBSTANCE USE FACILITY | End: 2022-09-15
Attending: FAMILY MEDICINE
Payer: MEDICAID

## 2022-09-15 VITALS
HEART RATE: 85 BPM | DIASTOLIC BLOOD PRESSURE: 86 MMHG | OXYGEN SATURATION: 95 % | HEIGHT: 74 IN | RESPIRATION RATE: 18 BRPM | BODY MASS INDEX: 35.36 KG/M2 | TEMPERATURE: 98 F | WEIGHT: 275.56 LBS | SYSTOLIC BLOOD PRESSURE: 132 MMHG

## 2022-09-15 DIAGNOSIS — R45.851 SUICIDAL IDEATION: Primary | ICD-10-CM

## 2022-09-15 LAB
ALBUMIN SERPL-MCNC: 4 GM/DL (ref 3.5–5)
ALBUMIN/GLOB SERPL: 1 RATIO (ref 1.1–2)
ALP SERPL-CCNC: 75 UNIT/L (ref 40–150)
ALT SERPL-CCNC: 18 UNIT/L (ref 0–55)
AMPHET UR QL SCN: NEGATIVE
APAP SERPL-MCNC: <17.4 UG/ML (ref 17.4–30)
APPEARANCE UR: ABNORMAL
AST SERPL-CCNC: 20 UNIT/L (ref 5–34)
BACTERIA #/AREA URNS AUTO: ABNORMAL /HPF
BARBITURATE SCN PRESENT UR: NEGATIVE
BASOPHILS # BLD AUTO: 0.01 X10(3)/MCL (ref 0–0.2)
BASOPHILS NFR BLD AUTO: 0.2 %
BENZODIAZ UR QL SCN: NEGATIVE
BILIRUB UR QL STRIP.AUTO: NEGATIVE MG/DL
BILIRUBIN DIRECT+TOT PNL SERPL-MCNC: 0.2 MG/DL
BUN SERPL-MCNC: 12.2 MG/DL (ref 8.9–20.6)
CALCIUM SERPL-MCNC: 9.5 MG/DL (ref 8.4–10.2)
CANNABINOIDS UR QL SCN: NEGATIVE
CHLORIDE SERPL-SCNC: 101 MMOL/L (ref 98–107)
CO2 SERPL-SCNC: 30 MMOL/L (ref 22–29)
COCAINE UR QL SCN: NEGATIVE
COLOR UR AUTO: YELLOW
CREAT SERPL-MCNC: 0.7 MG/DL (ref 0.73–1.18)
EOSINOPHIL # BLD AUTO: 0.04 X10(3)/MCL (ref 0–0.9)
EOSINOPHIL NFR BLD AUTO: 0.9 %
ERYTHROCYTE [DISTWIDTH] IN BLOOD BY AUTOMATED COUNT: 14.3 % (ref 11.5–17)
ETHANOL SERPL-MCNC: <10 MG/DL
FENTANYL UR QL SCN: NEGATIVE
GFR SERPLBLD CREATININE-BSD FMLA CKD-EPI: >60 MLS/MIN/1.73/M2
GLOBULIN SER-MCNC: 4 GM/DL (ref 2.4–3.5)
GLUCOSE SERPL-MCNC: 88 MG/DL (ref 74–100)
GLUCOSE UR QL STRIP.AUTO: NORMAL MG/DL
HCT VFR BLD AUTO: 45.7 % (ref 42–52)
HGB BLD-MCNC: 14.5 GM/DL (ref 14–18)
HYALINE CASTS #/AREA URNS LPF: ABNORMAL /LPF
IMM GRANULOCYTES # BLD AUTO: 0.03 X10(3)/MCL (ref 0–0.04)
IMM GRANULOCYTES NFR BLD AUTO: 0.7 %
KETONES UR QL STRIP.AUTO: NEGATIVE MG/DL
LEUKOCYTE ESTERASE UR QL STRIP.AUTO: NEGATIVE UNIT/L
LYMPHOCYTES # BLD AUTO: 0.88 X10(3)/MCL (ref 0.6–4.6)
LYMPHOCYTES NFR BLD AUTO: 20 %
MCH RBC QN AUTO: 29.2 PG (ref 27–31)
MCHC RBC AUTO-ENTMCNC: 31.7 MG/DL (ref 33–36)
MCV RBC AUTO: 92.1 FL (ref 80–94)
MDMA UR QL SCN: NEGATIVE
MONOCYTES # BLD AUTO: 0.56 X10(3)/MCL (ref 0.1–1.3)
MONOCYTES NFR BLD AUTO: 12.7 %
MUCOUS THREADS URNS QL MICRO: ABNORMAL /LPF
NEUTROPHILS # BLD AUTO: 2.9 X10(3)/MCL (ref 2.1–9.2)
NEUTROPHILS NFR BLD AUTO: 65.5 %
NITRITE UR QL STRIP.AUTO: NEGATIVE
NRBC BLD AUTO-RTO: 0 %
OPIATES UR QL SCN: NEGATIVE
PCP UR QL: NEGATIVE
PH UR STRIP.AUTO: 7 [PH]
PH UR: 7 [PH] (ref 3–11)
PLATELET # BLD AUTO: 245 X10(3)/MCL (ref 130–400)
PMV BLD AUTO: 10.9 FL (ref 7.4–10.4)
POTASSIUM SERPL-SCNC: 4.6 MMOL/L (ref 3.5–5.1)
PROT SERPL-MCNC: 8 GM/DL (ref 6.4–8.3)
PROT UR QL STRIP.AUTO: ABNORMAL MG/DL
RBC # BLD AUTO: 4.96 X10(6)/MCL (ref 4.7–6.1)
RBC #/AREA URNS AUTO: ABNORMAL /HPF
RBC UR QL AUTO: NEGATIVE UNIT/L
SARS-COV-2 RDRP RESP QL NAA+PROBE: NEGATIVE
SODIUM SERPL-SCNC: 140 MMOL/L (ref 136–145)
SP GR UR STRIP.AUTO: 1.02
SPECIFIC GRAVITY, URINE AUTO (.000) (OHS): 1.02 (ref 1–1.03)
SQUAMOUS #/AREA URNS LPF: ABNORMAL /HPF
TSH SERPL-ACNC: 1.23 UIU/ML (ref 0.35–4.94)
UNIDENT CRYS #/AREA URNS HPF: ABNORMAL /HPF
UROBILINOGEN UR STRIP-ACNC: NORMAL MG/DL
WBC # SPEC AUTO: 4.4 X10(3)/MCL (ref 4.5–11.5)
WBC #/AREA URNS AUTO: ABNORMAL /HPF
WBC CLUMPS UR QL AUTO: ABNORMAL /HPF
YEAST BUDDING URNS QL: ABNORMAL /HPF

## 2022-09-15 PROCEDURE — 81001 URINALYSIS AUTO W/SCOPE: CPT | Mod: 59 | Performed by: FAMILY MEDICINE

## 2022-09-15 PROCEDURE — 85025 COMPLETE CBC W/AUTO DIFF WBC: CPT | Performed by: FAMILY MEDICINE

## 2022-09-15 PROCEDURE — 36415 COLL VENOUS BLD VENIPUNCTURE: CPT | Performed by: FAMILY MEDICINE

## 2022-09-15 PROCEDURE — 84443 ASSAY THYROID STIM HORMONE: CPT | Performed by: FAMILY MEDICINE

## 2022-09-15 PROCEDURE — 82077 ASSAY SPEC XCP UR&BREATH IA: CPT | Performed by: FAMILY MEDICINE

## 2022-09-15 PROCEDURE — 80307 DRUG TEST PRSMV CHEM ANLYZR: CPT | Performed by: FAMILY MEDICINE

## 2022-09-15 PROCEDURE — 99285 EMERGENCY DEPT VISIT HI MDM: CPT

## 2022-09-15 PROCEDURE — 87088 URINE BACTERIA CULTURE: CPT | Performed by: FAMILY MEDICINE

## 2022-09-15 PROCEDURE — 80053 COMPREHEN METABOLIC PANEL: CPT | Performed by: FAMILY MEDICINE

## 2022-09-15 PROCEDURE — 87635 SARS-COV-2 COVID-19 AMP PRB: CPT | Performed by: FAMILY MEDICINE

## 2022-09-15 PROCEDURE — 80143 DRUG ASSAY ACETAMINOPHEN: CPT | Performed by: FAMILY MEDICINE

## 2022-09-15 NOTE — ED PROVIDER NOTES
"Encounter Date: 9/15/2022       History     Chief Complaint   Patient presents with    Suicidal     Recently PEC'd here and sent to [facility] behavioral. D/c'd to [facility], pt states he is still suicidal with plan to lay in traffic.      21 y/o M presents with SI. Recently discharged from behavioral facility " but they didn't do anything for me".     The history is provided by the patient. No  was used.   Mental Health Problem  The primary symptoms include suicidal ideas. The primary symptoms do not include self-injury. The current episode started just prior to arrival. This is a recurrent problem.   Additional symptoms of the illness do not include headaches. He admits to suicidal ideas. He does have a plan to attempt suicide. He contemplates harming himself. He has not already injured self. He does not contemplate injuring another person. He has not already  injured another person. Risk factors that are present for mental illness include a history of mental illness and substance abuse.   Review of patient's allergies indicates:   Allergen Reactions    D and c red no.7 Rash    [place] Rash     Past Medical History:   Diagnosis Date    Asthma     Bipolar disorder     Colitis     Drug abuse     Amphetamines and cannabis    Fetal alcohol syndrome     Foster child 2020    Impulse control disorder      No past surgical history on file.  No family history on file.  Social History     Tobacco Use    Smoking status: Never    Smokeless tobacco: Never   Substance Use Topics    Alcohol use: No    Drug use: Never     Review of Systems   Constitutional:  Negative for fever.   HENT:  Negative for congestion.    Musculoskeletal:  Negative for myalgias.   Skin:  Negative for rash.   Neurological:  Negative for headaches.   Psychiatric/Behavioral:  Positive for suicidal ideas. Negative for self-injury and sleep disturbance.      Physical Exam     Initial Vitals [09/15/22 1258]   BP Pulse Resp Temp " SpO2   (!) 145/90 101 18 -- 98 %      MAP       --         Physical Exam    Nursing note and vitals reviewed.  Constitutional: He appears well-developed and well-nourished. No distress.   HENT:   Head: Normocephalic and atraumatic.   Eyes: Conjunctivae are normal.   Cardiovascular:  Regular rhythm.           Pulmonary/Chest: Breath sounds normal.   Abdominal: Abdomen is soft. Bowel sounds are normal. There is no abdominal tenderness. There is no rebound and no guarding.   Musculoskeletal:         General: Normal range of motion.     Neurological: He is alert and oriented to person, place, and time. He has normal strength. No sensory deficit. Gait normal. GCS score is 15. GCS eye subscore is 4. GCS verbal subscore is 5. GCS motor subscore is 6.   Skin: Skin is warm and dry. Capillary refill takes less than 2 seconds.   Psychiatric: He has a normal mood and affect. His behavior is normal. Judgment and thought content normal.       ED Course   Procedures  Labs Reviewed   COMPREHENSIVE METABOLIC PANEL - Abnormal; Notable for the following components:       Result Value    Carbon Dioxide 30 (*)     Creatinine 0.70 (*)     Globulin 4.0 (*)     Albumin/Globulin Ratio 1.0 (*)     All other components within normal limits   URINALYSIS, REFLEX TO URINE CULTURE - Abnormal; Notable for the following components:    Appearance, UA Turbid (*)     Protein, UA Trace (*)     WBC, UA 21-50 (*)     WBC Clumps, UA Many (*)     Mucous, UA Trace (*)     Unclassified Crystal, UA Moderate (*)     All other components within normal limits   ACETAMINOPHEN LEVEL - Abnormal; Notable for the following components:    Acetaminophen Level <17.4 (*)     All other components within normal limits   CBC WITH DIFFERENTIAL - Abnormal; Notable for the following components:    WBC 4.4 (*)     MCHC 31.7 (*)     MPV 10.9 (*)     All other components within normal limits   TSH - Normal   DRUG SCREEN, URINE (BEAKER) - Normal    Narrative:     Cut off  concentrations:    Amphetamines - 1000 ng/ml  Barbiturates - 200 ng/ml  Benzodiazepine - 200 ng/ml  Cannabinoids (THC) - 50 ng/ml  Cocaine - 300 ng/ml  Fentanyl - 1.0 ng/ml  MDMA - 500 ng/ml  Opiates - 300 ng/ml   Phencyclidine (PCP) - 25 ng/ml    Specimen submitted for drug analysis and tested for pH and specific gravity in order to evaluate sample integrity. Suspect tampering if specific gravity is <1.003 and/or pH is not within the range of 4.5 - 8.0  False negatives may result form substances such as bleach added to urine.  False positives may result for the presence of a substance with similar chemical structure to the drug or its metabolite.    This test provides only a PRELIMINARY analytical test result. A more specific alternate chemical method must be used in order to obtain a confirmed analytical result. Gas chromatography/mass spectrometry (GC/MS) is the preferred confirmatory method. Other chemical confirmation methods are available. Clinical consideration and professional judgement should be applied to any drug of abuse test result, particularly when preliminary positive results are used.    Positive results will be confirmed only at the physicians request. Unconfirmed screening results are to be used only for medical purposes (treatment).        ALCOHOL,MEDICAL (ETHANOL) - Normal   CULTURE, URINE   CBC W/ AUTO DIFFERENTIAL    Narrative:     The following orders were created for panel order CBC auto differential.  Procedure                               Abnormality         Status                     ---------                               -----------         ------                     CBC with Differential[[id number]]        Abnormal            Final result                 Please view results for these tests on the individual orders.   SARS-COV-2 RNA AMPLIFICATION, QUAL          Imaging Results    None          Medications - No data to display  Medical Decision Making:   Pt presents with SI. Pt is PEC'd  in ER.               Medically cleared for psychiatry placement: 9/15/2022  3:34 PM         Clinical Impression:   Final diagnoses:  [R45.851] Suicidal ideation (Primary)      ED Disposition Condition    Transfer to Psych Facility Stable          ED Prescriptions    None       Follow-up Information    None          Rajesh Oh MD  09/15/22 1762

## 2022-09-18 LAB — BACTERIA UR CULT: NO GROWTH

## 2022-09-21 ENCOUNTER — HOSPITAL ENCOUNTER (EMERGENCY)
Facility: HOSPITAL | Age: 22
Discharge: PSYCHIATRIC OR SUBSTANCE USE FACILITY | End: 2022-09-21
Attending: STUDENT IN AN ORGANIZED HEALTH CARE EDUCATION/TRAINING PROGRAM
Payer: MEDICAID

## 2022-09-21 VITALS
HEIGHT: 70 IN | TEMPERATURE: 98 F | SYSTOLIC BLOOD PRESSURE: 128 MMHG | BODY MASS INDEX: 18.61 KG/M2 | DIASTOLIC BLOOD PRESSURE: 74 MMHG | WEIGHT: 130 LBS | HEART RATE: 82 BPM | RESPIRATION RATE: 20 BRPM | OXYGEN SATURATION: 99 %

## 2022-09-21 DIAGNOSIS — R45.851 SUICIDAL IDEATIONS: ICD-10-CM

## 2022-09-21 DIAGNOSIS — R44.0 AUDITORY HALLUCINATIONS: ICD-10-CM

## 2022-09-21 DIAGNOSIS — Z00.8 MEDICAL CLEARANCE FOR PSYCHIATRIC ADMISSION: Primary | ICD-10-CM

## 2022-09-21 LAB
ALBUMIN SERPL-MCNC: 3.8 GM/DL (ref 3.5–5)
ALBUMIN/GLOB SERPL: 1.2 RATIO (ref 1.1–2)
ALP SERPL-CCNC: 60 UNIT/L (ref 40–150)
ALT SERPL-CCNC: 14 UNIT/L (ref 0–55)
AMPHET UR QL SCN: NEGATIVE
APAP SERPL-MCNC: <17.4 UG/ML (ref 17.4–30)
APPEARANCE UR: ABNORMAL
AST SERPL-CCNC: 16 UNIT/L (ref 5–34)
BACTERIA #/AREA URNS AUTO: ABNORMAL /HPF
BARBITURATE SCN PRESENT UR: NEGATIVE
BASOPHILS # BLD AUTO: 0.03 X10(3)/MCL (ref 0–0.2)
BASOPHILS NFR BLD AUTO: 0.5 %
BENZODIAZ UR QL SCN: NEGATIVE
BILIRUB UR QL STRIP.AUTO: NEGATIVE MG/DL
BILIRUBIN DIRECT+TOT PNL SERPL-MCNC: 0.2 MG/DL
BUN SERPL-MCNC: 15.3 MG/DL (ref 8.9–20.6)
CALCIUM SERPL-MCNC: 9.2 MG/DL (ref 8.4–10.2)
CANNABINOIDS UR QL SCN: NEGATIVE
CHLORIDE SERPL-SCNC: 106 MMOL/L (ref 98–107)
CO2 SERPL-SCNC: 28 MMOL/L (ref 22–29)
COCAINE UR QL SCN: NEGATIVE
COLOR UR AUTO: YELLOW
CREAT SERPL-MCNC: 0.75 MG/DL (ref 0.73–1.18)
EOSINOPHIL # BLD AUTO: 0.09 X10(3)/MCL (ref 0–0.9)
EOSINOPHIL NFR BLD AUTO: 1.4 %
ERYTHROCYTE [DISTWIDTH] IN BLOOD BY AUTOMATED COUNT: 13.7 % (ref 11.5–17)
ETHANOL SERPL-MCNC: <10 MG/DL
FENTANYL UR QL SCN: NEGATIVE
FLUAV AG UPPER RESP QL IA.RAPID: NOT DETECTED
FLUBV AG UPPER RESP QL IA.RAPID: NOT DETECTED
GFR SERPLBLD CREATININE-BSD FMLA CKD-EPI: >60 MLS/MIN/1.73/M2
GLOBULIN SER-MCNC: 3.2 GM/DL (ref 2.4–3.5)
GLUCOSE SERPL-MCNC: 82 MG/DL (ref 74–100)
GLUCOSE UR QL STRIP.AUTO: NORMAL MG/DL
HCT VFR BLD AUTO: 37.8 % (ref 42–52)
HGB BLD-MCNC: 12.3 GM/DL (ref 14–18)
HYALINE CASTS #/AREA URNS LPF: ABNORMAL /LPF
IMM GRANULOCYTES # BLD AUTO: 0.06 X10(3)/MCL (ref 0–0.04)
IMM GRANULOCYTES NFR BLD AUTO: 0.9 %
KETONES UR QL STRIP.AUTO: NEGATIVE MG/DL
LEUKOCYTE ESTERASE UR QL STRIP.AUTO: NEGATIVE UNIT/L
LYMPHOCYTES # BLD AUTO: 1.51 X10(3)/MCL (ref 0.6–4.6)
LYMPHOCYTES NFR BLD AUTO: 23.5 %
MCH RBC QN AUTO: 29.6 PG (ref 27–31)
MCHC RBC AUTO-ENTMCNC: 32.5 MG/DL (ref 33–36)
MCV RBC AUTO: 90.9 FL (ref 80–94)
MDMA UR QL SCN: NEGATIVE
MONOCYTES # BLD AUTO: 0.72 X10(3)/MCL (ref 0.1–1.3)
MONOCYTES NFR BLD AUTO: 11.2 %
MUCOUS THREADS URNS QL MICRO: ABNORMAL /LPF
NEUTROPHILS # BLD AUTO: 4 X10(3)/MCL (ref 2.1–9.2)
NEUTROPHILS NFR BLD AUTO: 62.5 %
NITRITE UR QL STRIP.AUTO: NEGATIVE
NRBC BLD AUTO-RTO: 0 %
OPIATES UR QL SCN: NEGATIVE
PCP UR QL: NEGATIVE
PH UR STRIP.AUTO: 8 [PH]
PH UR: 8 [PH] (ref 3–11)
PLATELET # BLD AUTO: 233 X10(3)/MCL (ref 130–400)
PMV BLD AUTO: 10.1 FL (ref 7.4–10.4)
POTASSIUM SERPL-SCNC: 3.7 MMOL/L (ref 3.5–5.1)
PROT SERPL-MCNC: 7 GM/DL (ref 6.4–8.3)
PROT UR QL STRIP.AUTO: ABNORMAL MG/DL
RBC # BLD AUTO: 4.16 X10(6)/MCL (ref 4.7–6.1)
RBC #/AREA URNS AUTO: ABNORMAL /HPF
RBC UR QL AUTO: NEGATIVE UNIT/L
SALICYLATES SERPL-MCNC: <5 MG/DL
SARS-COV-2 RNA RESP QL NAA+PROBE: NOT DETECTED
SODIUM SERPL-SCNC: 143 MMOL/L (ref 136–145)
SP GR UR STRIP.AUTO: 1.03
SPECIFIC GRAVITY, URINE AUTO (.000) (OHS): 1.03 (ref 1–1.03)
SQUAMOUS #/AREA URNS LPF: ABNORMAL /HPF
TSH SERPL-ACNC: 0.61 UIU/ML (ref 0.35–4.94)
UNIDENT CRYS #/AREA URNS HPF: ABNORMAL /HPF
UROBILINOGEN UR STRIP-ACNC: NORMAL MG/DL
WBC # SPEC AUTO: 6.4 X10(3)/MCL (ref 4.5–11.5)
WBC #/AREA URNS AUTO: ABNORMAL /HPF
WBC CLUMPS UR QL AUTO: ABNORMAL /HPF
YEAST BUDDING URNS QL: ABNORMAL /HPF

## 2022-09-21 PROCEDURE — 84443 ASSAY THYROID STIM HORMONE: CPT | Performed by: STUDENT IN AN ORGANIZED HEALTH CARE EDUCATION/TRAINING PROGRAM

## 2022-09-21 PROCEDURE — 99285 EMERGENCY DEPT VISIT HI MDM: CPT | Mod: 25

## 2022-09-21 PROCEDURE — 87636 SARSCOV2 & INF A&B AMP PRB: CPT | Performed by: STUDENT IN AN ORGANIZED HEALTH CARE EDUCATION/TRAINING PROGRAM

## 2022-09-21 PROCEDURE — 80053 COMPREHEN METABOLIC PANEL: CPT | Performed by: STUDENT IN AN ORGANIZED HEALTH CARE EDUCATION/TRAINING PROGRAM

## 2022-09-21 PROCEDURE — 80143 DRUG ASSAY ACETAMINOPHEN: CPT | Performed by: STUDENT IN AN ORGANIZED HEALTH CARE EDUCATION/TRAINING PROGRAM

## 2022-09-21 PROCEDURE — 36415 COLL VENOUS BLD VENIPUNCTURE: CPT | Performed by: STUDENT IN AN ORGANIZED HEALTH CARE EDUCATION/TRAINING PROGRAM

## 2022-09-21 PROCEDURE — 80179 DRUG ASSAY SALICYLATE: CPT | Performed by: STUDENT IN AN ORGANIZED HEALTH CARE EDUCATION/TRAINING PROGRAM

## 2022-09-21 PROCEDURE — 96372 THER/PROPH/DIAG INJ SC/IM: CPT | Performed by: STUDENT IN AN ORGANIZED HEALTH CARE EDUCATION/TRAINING PROGRAM

## 2022-09-21 PROCEDURE — 82077 ASSAY SPEC XCP UR&BREATH IA: CPT | Performed by: STUDENT IN AN ORGANIZED HEALTH CARE EDUCATION/TRAINING PROGRAM

## 2022-09-21 PROCEDURE — 80307 DRUG TEST PRSMV CHEM ANLYZR: CPT | Performed by: STUDENT IN AN ORGANIZED HEALTH CARE EDUCATION/TRAINING PROGRAM

## 2022-09-21 PROCEDURE — 81001 URINALYSIS AUTO W/SCOPE: CPT | Mod: 59 | Performed by: STUDENT IN AN ORGANIZED HEALTH CARE EDUCATION/TRAINING PROGRAM

## 2022-09-21 PROCEDURE — 85025 COMPLETE CBC W/AUTO DIFF WBC: CPT | Performed by: STUDENT IN AN ORGANIZED HEALTH CARE EDUCATION/TRAINING PROGRAM

## 2022-09-21 PROCEDURE — 87088 URINE BACTERIA CULTURE: CPT | Performed by: STUDENT IN AN ORGANIZED HEALTH CARE EDUCATION/TRAINING PROGRAM

## 2022-09-21 PROCEDURE — 63600175 PHARM REV CODE 636 W HCPCS: Performed by: STUDENT IN AN ORGANIZED HEALTH CARE EDUCATION/TRAINING PROGRAM

## 2022-09-21 RX ORDER — MIDAZOLAM HYDROCHLORIDE 1 MG/ML
2 INJECTION INTRAMUSCULAR; INTRAVENOUS
Status: COMPLETED | OUTPATIENT
Start: 2022-09-21 | End: 2022-09-21

## 2022-09-21 RX ORDER — DIPHENHYDRAMINE HYDROCHLORIDE 50 MG/ML
50 INJECTION INTRAMUSCULAR; INTRAVENOUS
Status: COMPLETED | OUTPATIENT
Start: 2022-09-21 | End: 2022-09-21

## 2022-09-21 RX ORDER — HALOPERIDOL 5 MG/ML
5 INJECTION INTRAMUSCULAR
Status: COMPLETED | OUTPATIENT
Start: 2022-09-21 | End: 2022-09-21

## 2022-09-21 RX ADMIN — MIDAZOLAM 2 MG: 1 INJECTION INTRAMUSCULAR; INTRAVENOUS at 03:09

## 2022-09-21 RX ADMIN — DIPHENHYDRAMINE HYDROCHLORIDE 50 MG: 50 INJECTION INTRAMUSCULAR; INTRAVENOUS at 03:09

## 2022-09-21 RX ADMIN — HALOPERIDOL LACTATE 5 MG: 5 INJECTION, SOLUTION INTRAMUSCULAR at 03:09

## 2022-09-21 NOTE — ED PROVIDER NOTES
"Encounter Date: 9/21/2022       History     Chief Complaint   Patient presents with    Psychiatric Evaluation     Pt brought in by PD after laying in the road with SI. When asked if he wants to hurt anyone else pt states, "try me and find out" . Denies a/v hallucinations.      22-year-old male presents to ED after recently being released from a psychiatric facility for SI.  Patient states he actively wants to jumped in front of traffic and kill himself.  He states his baseline auditory hallucinations are worsening as well.  He denies any visual hallucinations.  No drug or alcohol abuse.  No recent successful attempts at harming himself.  no overdose attempts. he recently signed out of a psychiatric facility then his symptoms worsen.  Questionable medication compliance.  No other complaints or concerns at this time.     Review of patient's allergies indicates:   Allergen Reactions    D and c red no.7 Rash    [place] Rash     Past Medical History:   Diagnosis Date    Asthma     Bipolar disorder     Colitis     Drug abuse     Amphetamines and cannabis    Fetal alcohol syndrome     Foster child 2020    Impulse control disorder      No past surgical history on file.  No family history on file.  Social History     Tobacco Use    Smoking status: Never    Smokeless tobacco: Never   Substance Use Topics    Alcohol use: No    Drug use: Never     Review of Systems   Constitutional:  Negative for chills and fever.   HENT:  Negative for congestion, rhinorrhea and sore throat.    Eyes:  Negative for pain, discharge and itching.   Respiratory:  Negative for chest tightness and shortness of breath.    Cardiovascular:  Negative for chest pain and palpitations.   Gastrointestinal:  Negative for abdominal pain, nausea and vomiting.   Genitourinary:  Negative for dysuria and hematuria.   Musculoskeletal:  Negative for myalgias and neck pain.   Skin:  Negative for color change and rash.   Neurological:  Negative for dizziness, " weakness and headaches.   Psychiatric/Behavioral:  Positive for hallucinations and suicidal ideas. Negative for confusion. The patient is not hyperactive.      Physical Exam     Initial Vitals [09/21/22 1553]   BP Pulse Resp Temp SpO2   130/89 88 20 98.4 °F (36.9 °C) 99 %      MAP       --         Physical Exam    Constitutional: He appears well-developed and well-nourished. He is not diaphoretic. No distress.   HENT:   Head: Normocephalic and atraumatic.   Eyes: Conjunctivae and EOM are normal. Pupils are equal, round, and reactive to light.   Neck: Neck supple. No tracheal deviation present.   Normal range of motion.  Cardiovascular:  Normal rate, regular rhythm and normal heart sounds.           Pulmonary/Chest: Breath sounds normal. No respiratory distress.   Abdominal: Abdomen is soft. There is no abdominal tenderness. There is no rebound.   Musculoskeletal:         General: No tenderness. Normal range of motion.      Cervical back: Normal range of motion and neck supple.     Neurological: He is alert and oriented to person, place, and time. He has normal strength. GCS score is 15. GCS eye subscore is 4. GCS verbal subscore is 5. GCS motor subscore is 6.   Skin: Skin is warm and dry. Capillary refill takes less than 2 seconds. No rash noted.   Psychiatric: His behavior is normal. His mood appears anxious. His speech is rapid and/or pressured. He is actively hallucinating. Thought content is not paranoid and not delusional. He expresses impulsivity. He expresses suicidal ideation. He expresses no homicidal ideation. He expresses suicidal plans. He expresses no homicidal plans. He is attentive.       ED Course   Critical Care    Date/Time: 9/21/2022 6:25 PM  Performed by: Dennis Rodas MD  Authorized by: Dennis Rodas MD   Total critical care time (exclusive of procedural time) : 30 minutes  Critical care time was exclusive of separately billable procedures and treating other patients.  Critical care was  necessary to treat or prevent imminent or life-threatening deterioration of the following conditions: CNS failure or compromise.  Critical care was time spent personally by me on the following activities: evaluation of patient's response to treatment, obtaining history from patient or surrogate, ordering and review of laboratory studies, pulse oximetry, review of old charts, development of treatment plan with patient or surrogate, examination of patient, ordering and performing treatments and interventions, ordering and review of radiographic studies and re-evaluation of patient's condition.  Comments: Chemical sedation      Labs Reviewed   URINALYSIS, REFLEX TO URINE CULTURE - Abnormal; Notable for the following components:       Result Value    Appearance, UA Turbid (*)     Protein, UA Trace (*)     WBC, UA 11-20 (*)     WBC Clumps, UA Many (*)     Budding Yeast, UA Many (*)     Mucous, UA Trace (*)     Unclassified Crystal, UA Moderate (*)     All other components within normal limits   ACETAMINOPHEN LEVEL - Abnormal; Notable for the following components:    Acetaminophen Level <17.4 (*)     All other components within normal limits   CBC WITH DIFFERENTIAL - Abnormal; Notable for the following components:    RBC 4.16 (*)     Hgb 12.3 (*)     Hct 37.8 (*)     MCHC 32.5 (*)     IG# 0.06 (*)     All other components within normal limits   TSH - Normal   DRUG SCREEN, URINE (BEAKER) - Normal    Narrative:     Cut off concentrations:    Amphetamines - 1000 ng/ml  Barbiturates - 200 ng/ml  Benzodiazepine - 200 ng/ml  Cannabinoids (THC) - 50 ng/ml  Cocaine - 300 ng/ml  Fentanyl - 1.0 ng/ml  MDMA - 500 ng/ml  Opiates - 300 ng/ml   Phencyclidine (PCP) - 25 ng/ml    Specimen submitted for drug analysis and tested for pH and specific gravity in order to evaluate sample integrity. Suspect tampering if specific gravity is <1.003 and/or pH is not within the range of 4.5 - 8.0  False negatives may result form substances such as  bleach added to urine.  False positives may result for the presence of a substance with similar chemical structure to the drug or its metabolite.    This test provides only a PRELIMINARY analytical test result. A more specific alternate chemical method must be used in order to obtain a confirmed analytical result. Gas chromatography/mass spectrometry (GC/MS) is the preferred confirmatory method. Other chemical confirmation methods are available. Clinical consideration and professional judgement should be applied to any drug of abuse test result, particularly when preliminary positive results are used.    Positive results will be confirmed only at the physicians request. Unconfirmed screening results are to be used only for medical purposes (treatment).        ALCOHOL,MEDICAL (ETHANOL) - Normal   COVID/FLU A&B PCR - Normal   CULTURE, URINE   CBC W/ AUTO DIFFERENTIAL    Narrative:     The following orders were created for panel order CBC auto differential.  Procedure                               Abnormality         Status                     ---------                               -----------         ------                     CBC with Differential[[id number]]        Abnormal            Final result                 Please view results for these tests on the individual orders.   COMPREHENSIVE METABOLIC PANEL   SALICYLATE LEVEL   EXTRA TUBES    Narrative:     The following orders were created for panel order EXTRA TUBES.  Procedure                               Abnormality         Status                     ---------                               -----------         ------                     Light Blue Top Hold[[id number]]                              In process                 Red Top Hold[[id number]]                                     In process                   Please view results for these tests on the individual orders.   LIGHT BLUE TOP HOLD   RED TOP HOLD          Imaging Results    None          Medications    midazolam (VERSED) 1 mg/mL injection 2 mg (2 mg Intramuscular Given 9/21/22 1540)   diphenhydrAMINE injection 50 mg (50 mg Intramuscular Given 9/21/22 1540)   haloperidol lactate injection 5 mg (5 mg Intramuscular Given 9/21/22 1540)     Medical Decision Making:   Clinical Tests:   Lab Tests: Reviewed and Ordered  ED Management:  22-year-old male presents to ED with suicidal ideations with active plan and worsening auditory hallucinations.  On exam he is anxious.  His vitals are grossly stable.  He was given a chemical cocktail to relax.  Afterwards he was much more cooperative. PEC'd for patient's safety.  His laboratory analysis was grossly unremarkable at this time so therefore he is medically cleared for placement at 1st available psychiatric facility.  Awaiting acceptance and transfer at this time. (Adrianna)                        Clinical Impression:   Final diagnoses:  [Z00.8] Medical clearance for psychiatric admission (Primary)  [R45.851] Suicidal ideations  [R44.0] Auditory hallucinations        ED Disposition Condition    Transfer to Psych Facility Stable          ED Prescriptions    None       Follow-up Information    None          Dennis Rodas MD  09/21/22 8206

## 2022-09-24 LAB — BACTERIA UR CULT: NORMAL

## 2022-10-18 ENCOUNTER — HOSPITAL ENCOUNTER (EMERGENCY)
Facility: HOSPITAL | Age: 22
Discharge: HOME | End: 2022-10-18
Attending: EMERGENCY MEDICINE
Payer: MEDICAID

## 2022-10-18 ENCOUNTER — HOSPITAL ENCOUNTER (EMERGENCY)
Facility: HOSPITAL | Age: 22
Discharge: HOME | End: 2022-10-19
Attending: FAMILY MEDICINE
Payer: MEDICAID

## 2022-10-18 VITALS
WEIGHT: 145 LBS | RESPIRATION RATE: 17 BRPM | OXYGEN SATURATION: 98 % | BODY MASS INDEX: 18.61 KG/M2 | TEMPERATURE: 99 F | SYSTOLIC BLOOD PRESSURE: 135 MMHG | DIASTOLIC BLOOD PRESSURE: 70 MMHG | HEIGHT: 74 IN | HEART RATE: 95 BPM

## 2022-10-18 DIAGNOSIS — R56.9 SEIZURE: Primary | ICD-10-CM

## 2022-10-18 DIAGNOSIS — R07.9 CHEST PAIN: ICD-10-CM

## 2022-10-18 DIAGNOSIS — G40.909 SEIZURE DISORDER: Primary | ICD-10-CM

## 2022-10-18 LAB
ALBUMIN SERPL BCP-MCNC: 4.3 G/DL (ref 3.5–5.2)
ALP SERPL-CCNC: 83 U/L (ref 55–135)
ALT SERPL W/O P-5'-P-CCNC: 17 U/L (ref 10–44)
ANION GAP SERPL CALC-SCNC: 10 MMOL/L (ref 8–16)
AST SERPL-CCNC: 16 U/L (ref 10–40)
BASOPHILS # BLD AUTO: 0.01 K/UL (ref 0–0.2)
BASOPHILS NFR BLD: 0.1 % (ref 0–1.9)
BILIRUB SERPL-MCNC: 0.3 MG/DL (ref 0.1–1)
BILIRUB UR QL STRIP: NEGATIVE
BNP SERPL-MCNC: <10 PG/ML (ref 0–99)
BUN SERPL-MCNC: 8 MG/DL (ref 6–20)
CALCIUM SERPL-MCNC: 9.6 MG/DL (ref 8.7–10.5)
CHLORIDE SERPL-SCNC: 103 MMOL/L (ref 95–110)
CLARITY UR: CLEAR
CO2 SERPL-SCNC: 25 MMOL/L (ref 23–29)
COLOR UR: YELLOW
CREAT SERPL-MCNC: 0.7 MG/DL (ref 0.5–1.4)
DIFFERENTIAL METHOD: ABNORMAL
EOSINOPHIL # BLD AUTO: 0 K/UL (ref 0–0.5)
EOSINOPHIL NFR BLD: 0.3 % (ref 0–8)
ERYTHROCYTE [DISTWIDTH] IN BLOOD BY AUTOMATED COUNT: 13.1 % (ref 11.5–14.5)
EST. GFR  (NO RACE VARIABLE): >60 ML/MIN/1.73 M^2
ETHANOL SERPL-MCNC: <10 MG/DL
GLUCOSE SERPL-MCNC: 98 MG/DL (ref 70–110)
GLUCOSE UR QL STRIP: NEGATIVE
HCT VFR BLD AUTO: 43 % (ref 40–54)
HGB BLD-MCNC: 14.6 G/DL (ref 14–18)
HGB UR QL STRIP: NEGATIVE
IMM GRANULOCYTES # BLD AUTO: 0.02 K/UL (ref 0–0.04)
IMM GRANULOCYTES NFR BLD AUTO: 0.3 % (ref 0–0.5)
KETONES UR QL STRIP: NEGATIVE
LEUKOCYTE ESTERASE UR QL STRIP: NEGATIVE
LYMPHOCYTES # BLD AUTO: 1.3 K/UL (ref 1–4.8)
LYMPHOCYTES NFR BLD: 17.6 % (ref 18–48)
MCH RBC QN AUTO: 29.9 PG (ref 27–31)
MCHC RBC AUTO-ENTMCNC: 34 G/DL (ref 32–36)
MCV RBC AUTO: 88 FL (ref 82–98)
MONOCYTES # BLD AUTO: 0.6 K/UL (ref 0.3–1)
MONOCYTES NFR BLD: 8.4 % (ref 4–15)
NEUTROPHILS # BLD AUTO: 5.3 K/UL (ref 1.8–7.7)
NEUTROPHILS NFR BLD: 73.3 % (ref 38–73)
NITRITE UR QL STRIP: NEGATIVE
NRBC BLD-RTO: 0 /100 WBC
PH UR STRIP: 7 [PH] (ref 5–8)
PLATELET # BLD AUTO: 255 K/UL (ref 150–450)
PMV BLD AUTO: 10.1 FL (ref 9.2–12.9)
POCT GLUCOSE: 97 MG/DL (ref 70–110)
POTASSIUM SERPL-SCNC: 4.3 MMOL/L (ref 3.5–5.1)
PROT SERPL-MCNC: 7.5 G/DL (ref 6–8.4)
PROT UR QL STRIP: ABNORMAL
RBC # BLD AUTO: 4.88 M/UL (ref 4.6–6.2)
SODIUM SERPL-SCNC: 138 MMOL/L (ref 136–145)
SP GR UR STRIP: 1.01 (ref 1–1.03)
URN SPEC COLLECT METH UR: ABNORMAL
UROBILINOGEN UR STRIP-ACNC: NEGATIVE EU/DL
WBC # BLD AUTO: 7.28 K/UL (ref 3.9–12.7)

## 2022-10-18 PROCEDURE — 99283 EMERGENCY DEPT VISIT LOW MDM: CPT | Mod: 25

## 2022-10-18 PROCEDURE — 63600175 PHARM REV CODE 636 W HCPCS: Performed by: EMERGENCY MEDICINE

## 2022-10-18 PROCEDURE — 93010 ELECTROCARDIOGRAM REPORT: CPT | Mod: ,,, | Performed by: INTERNAL MEDICINE

## 2022-10-18 PROCEDURE — 93010 EKG 12-LEAD: ICD-10-PCS | Mod: ,,, | Performed by: INTERNAL MEDICINE

## 2022-10-18 PROCEDURE — 81003 URINALYSIS AUTO W/O SCOPE: CPT | Mod: 59 | Performed by: EMERGENCY MEDICINE

## 2022-10-18 PROCEDURE — 80053 COMPREHEN METABOLIC PANEL: CPT | Performed by: EMERGENCY MEDICINE

## 2022-10-18 PROCEDURE — 96361 HYDRATE IV INFUSION ADD-ON: CPT

## 2022-10-18 PROCEDURE — 96374 THER/PROPH/DIAG INJ IV PUSH: CPT

## 2022-10-18 PROCEDURE — 85025 COMPLETE CBC W/AUTO DIFF WBC: CPT | Performed by: EMERGENCY MEDICINE

## 2022-10-18 PROCEDURE — 83880 ASSAY OF NATRIURETIC PEPTIDE: CPT | Performed by: EMERGENCY MEDICINE

## 2022-10-18 PROCEDURE — 93005 ELECTROCARDIOGRAM TRACING: CPT

## 2022-10-18 PROCEDURE — 99285 EMERGENCY DEPT VISIT HI MDM: CPT | Mod: 25,27

## 2022-10-18 PROCEDURE — 25000003 PHARM REV CODE 250: Performed by: FAMILY MEDICINE

## 2022-10-18 PROCEDURE — 25000003 PHARM REV CODE 250: Performed by: EMERGENCY MEDICINE

## 2022-10-18 PROCEDURE — 82077 ASSAY SPEC XCP UR&BREATH IA: CPT | Performed by: EMERGENCY MEDICINE

## 2022-10-18 PROCEDURE — 82962 GLUCOSE BLOOD TEST: CPT

## 2022-10-18 RX ORDER — LORAZEPAM 2 MG/ML
1 INJECTION INTRAMUSCULAR
Status: COMPLETED | OUTPATIENT
Start: 2022-10-18 | End: 2022-10-18

## 2022-10-18 RX ORDER — LEVETIRACETAM 500 MG/1
1000 TABLET ORAL 2 TIMES DAILY
Status: DISCONTINUED | OUTPATIENT
Start: 2022-10-18 | End: 2022-10-19 | Stop reason: HOSPADM

## 2022-10-18 RX ADMIN — SODIUM CHLORIDE 1000 ML: 0.9 INJECTION, SOLUTION INTRAVENOUS at 04:10

## 2022-10-18 RX ADMIN — LEVETIRACETAM 1000 MG: 500 TABLET, FILM COATED ORAL at 08:10

## 2022-10-18 RX ADMIN — LORAZEPAM 1 MG: 2 INJECTION INTRAMUSCULAR; INTRAVENOUS at 02:10

## 2022-10-18 NOTE — ED PROVIDER NOTES
SCRIBE #1 NOTE: I, Camilo Kline/Fouzia Strong, am scribing for, and in the presence of, Martha Deng Do, MD. I have scribed the HPI, ROS, and PEx.     SCRIBE #2 NOTE: I, Grisel Hansen, am scribing for, and in the presence of,  Vonnie Borden MD. I have scribed the remaining portions of the note not scribed by Scribe #1.    History     Chief Complaint   Patient presents with    Seizures     Pt apparently had a seizure, hx of seizures non compliant with meds. Aaox 4     Review of patient's allergies indicates:   Allergen Reactions    D and c red no.7 Rash    [place] Rash         History of Present Illness     HPI    10/18/2022, 2:22 PM  History obtained from the patient      History of Present Illness: Jerardo Middleton is a 22 y.o. male patient with a PMHx of bipolar disorder, seizures, drug abuse who presents to the Emergency Department for evaluation following an episode of seizures just PTA. Symptoms are episodic and moderate in severity. No mitigating or exacerbating factors reported. No associated sxs reported. Patient denies any fever, chills, n/v/d, CP, SOB, lightheadedness, numbness, HA and all other sxs at this time. Pt has not been compliant with his seizure medications for the past year. No further complaints or concerns at this time.       Arrival mode: EMS    PCP: Primary Doctor No        Past Medical History:  Past Medical History:   Diagnosis Date    ADHD (attention deficit hyperactivity disorder)     Asthma     Bipolar disorder     Colitis     Drug abuse     Amphetamines and cannabis    Fetal alcohol syndrome     Foster child 2020    History of psychiatric hospitalization     Hx of psychiatric care     Impulse control disorder     Angela     Psychiatric exam requested by authority     Psychiatric problem     Schizoaffective disorder     Seizures     Sleep difficulties     Therapy        Past Surgical History:  No past surgical history on file.      Family History:  Family History   Family  history unknown: Yes       Social History:  Social History     Tobacco Use    Smoking status: Every Day     Packs/day: 0.50     Years: 7.00     Pack years: 3.50     Types: Cigarettes    Smokeless tobacco: Never   Substance and Sexual Activity    Alcohol use: No    Drug use: Not Currently    Sexual activity: Yes     Partners: Female     Birth control/protection: None        Review of Systems     Review of Systems   Constitutional:  Negative for chills and fever.   HENT:  Negative for sore throat.    Respiratory:  Negative for shortness of breath.    Cardiovascular:  Negative for chest pain.   Gastrointestinal:  Negative for diarrhea, nausea and vomiting.   Genitourinary:  Negative for dysuria.   Musculoskeletal:  Negative for back pain.   Skin:  Negative for rash.   Neurological:  Positive for seizures. Negative for weakness, numbness and headaches.   Hematological:  Does not bruise/bleed easily.   All other systems reviewed and are negative.     Physical Exam     Initial Vitals [10/18/22 1343]   BP Pulse Resp Temp SpO2   (!) 150/98 90 18 98.6 °F (37 °C) 96 %      MAP       --          Physical Exam  Nursing Notes and Vital Signs Reviewed.  Constitutional: Patient is in no acute distress. Well-developed and well-nourished.  Head: Atraumatic. Normocephalic.  Eyes: PERRL. EOM intact. Conjunctivae are not pale. No scleral icterus.  ENT: Mucous membranes are moist. Oropharynx is clear and symmetric.    Neck: Supple. Full ROM.   Cardiovascular: Regular rate. Regular rhythm. No murmurs, rubs, or gallops. Distal pulses are 2+ and symmetric.  Pulmonary/Chest: No respiratory distress. Clear to auscultation bilaterally. No wheezing or rales.  Abdominal: Soft and non-distended.  There is no tenderness.  No rebound, guarding, or rigidity.   Musculoskeletal: Moves all extremities. No obvious deformities. No edema. No calf tenderness.  Skin: Warm and dry.  Neurological:  Alert, awake, and appropriate.  Normal speech.  No acute  "focal neurological deficits are appreciated.  Psychiatric: Normal affect. Good eye contact. Appropriate in content.     ED Course   Procedures  ED Vital Signs:  Vitals:    10/18/22 1343 10/18/22 1515 10/18/22 1600 10/18/22 1830   BP: (!) 150/98 122/76 130/72 135/70   Pulse: 90 83 74 95   Resp: 18 17 17 17   Temp: 98.6 °F (37 °C)      TempSrc: Oral      SpO2: 96% 95% 97% 98%   Weight: 65.8 kg (145 lb)      Height: 6' 2" (1.88 m)          Abnormal Lab Results:  Labs Reviewed   CBC W/ AUTO DIFFERENTIAL - Abnormal; Notable for the following components:       Result Value    Gran % 73.3 (*)     Lymph % 17.6 (*)     All other components within normal limits   URINALYSIS, REFLEX TO URINE CULTURE - Abnormal; Notable for the following components:    Protein, UA Trace (*)     All other components within normal limits    Narrative:     Specimen Source->Urine   COMPREHENSIVE METABOLIC PANEL   B-TYPE NATRIURETIC PEPTIDE   ALCOHOL,MEDICAL (ETHANOL)   DRUG SCREEN PANEL, URINE EMERGENCY   POCT GLUCOSE        All Lab Results:  Results for orders placed or performed during the hospital encounter of 10/18/22   CBC auto differential   Result Value Ref Range    WBC 7.28 3.90 - 12.70 K/uL    RBC 4.88 4.60 - 6.20 M/uL    Hemoglobin 14.6 14.0 - 18.0 g/dL    Hematocrit 43.0 40.0 - 54.0 %    MCV 88 82 - 98 fL    MCH 29.9 27.0 - 31.0 pg    MCHC 34.0 32.0 - 36.0 g/dL    RDW 13.1 11.5 - 14.5 %    Platelets 255 150 - 450 K/uL    MPV 10.1 9.2 - 12.9 fL    Immature Granulocytes 0.3 0.0 - 0.5 %    Gran # (ANC) 5.3 1.8 - 7.7 K/uL    Immature Grans (Abs) 0.02 0.00 - 0.04 K/uL    Lymph # 1.3 1.0 - 4.8 K/uL    Mono # 0.6 0.3 - 1.0 K/uL    Eos # 0.0 0.0 - 0.5 K/uL    Baso # 0.01 0.00 - 0.20 K/uL    nRBC 0 0 /100 WBC    Gran % 73.3 (H) 38.0 - 73.0 %    Lymph % 17.6 (L) 18.0 - 48.0 %    Mono % 8.4 4.0 - 15.0 %    Eosinophil % 0.3 0.0 - 8.0 %    Basophil % 0.1 0.0 - 1.9 %    Differential Method Automated    Comprehensive metabolic panel   Result Value Ref " Range    Sodium 138 136 - 145 mmol/L    Potassium 4.3 3.5 - 5.1 mmol/L    Chloride 103 95 - 110 mmol/L    CO2 25 23 - 29 mmol/L    Glucose 98 70 - 110 mg/dL    BUN 8 6 - 20 mg/dL    Creatinine 0.7 0.5 - 1.4 mg/dL    Calcium 9.6 8.7 - 10.5 mg/dL    Total Protein 7.5 6.0 - 8.4 g/dL    Albumin 4.3 3.5 - 5.2 g/dL    Total Bilirubin 0.3 0.1 - 1.0 mg/dL    Alkaline Phosphatase 83 55 - 135 U/L    AST 16 10 - 40 U/L    ALT 17 10 - 44 U/L    Anion Gap 10 8 - 16 mmol/L    eGFR >60 >60 mL/min/1.73 m^2   BNP   Result Value Ref Range    BNP <10 0 - 99 pg/mL   Ethanol   Result Value Ref Range    Alcohol, Serum <10 <10 mg/dL   Urinalysis, Reflex to Urine Culture Urine, Clean Catch    Specimen: Urine   Result Value Ref Range    Specimen UA Urine, Clean Catch     Color, UA Yellow Yellow, Straw, Wendy    Appearance, UA Clear Clear    pH, UA 7.0 5.0 - 8.0    Specific Gravity, UA 1.015 1.005 - 1.030    Protein, UA Trace (A) Negative    Glucose, UA Negative Negative    Ketones, UA Negative Negative    Bilirubin (UA) Negative Negative    Occult Blood UA Negative Negative    Nitrite, UA Negative Negative    Urobilinogen, UA Negative <2.0 EU/dL    Leukocytes, UA Negative Negative   POCT glucose   Result Value Ref Range    POCT Glucose 97 70 - 110 mg/dL        Imaging Results:  Imaging Results              X-Ray Chest AP Portable (Final result)  Result time 10/18/22 14:39:57      Final result by Josue Samson MD (10/18/22 14:39:57)                   Impression:      No acute abnormality.      Electronically signed by: Josue Samson  Date:    10/18/2022  Time:    14:39               Narrative:    EXAMINATION:  XR CHEST AP PORTABLE    CLINICAL HISTORY:  Chest Pain;    TECHNIQUE:  Single frontal view of the chest was performed.    COMPARISON:  01/04/2022    FINDINGS:  The lungs are clear, with normal appearance of pulmonary vasculature and no pleural effusion or pneumothorax.    The cardiac silhouette is normal in size. The hilar and  mediastinal contours are unremarkable.    Bones are intact.                                       The EKG was ordered, reviewed, and independently interpreted by the ED provider.  Interpretation time: 15:03  Rate: 71 BPM  Rhythm: Normal sinus rhythm.  Interpretation: No acute ST changes. No STEMI.           The Emergency Provider reviewed the vital signs and test results, which are outlined above.     ED Discussion     3:43 PM: Dr. Parr transfers care of patient to Dr. Borden pending lab results.    6:33 PM: Reassessed pt at this time.  Discussed with pt all pertinent ED information and results. Discussed pt dx and plan of tx. Gave pt all f/u and return to the ED instructions. All questions and concerns were addressed at this time. Pt expresses understanding of information and instructions, and is comfortable with plan to discharge. Pt is stable for discharge.    I discussed with patient and/or family/caretaker that evaluation in the ED does not suggest any emergent or life threatening medical conditions requiring immediate intervention beyond what was provided in the ED, and I believe patient is safe for discharge.  Regardless, an unremarkable evaluation in the ED does not preclude the development or presence of a serious of life threatening condition. As such, patient was instructed to return immediately for any worsening or change in current symptoms.        Medical Decision Making:   Clinical Tests:   Lab Tests: Ordered and Reviewed  Radiological Study: Ordered and Reviewed  Medical Tests: Ordered and Reviewed         ED Medication(s):  Medications   sodium chloride 0.9% bolus 1,000 mL (0 mLs Intravenous Stopped 10/18/22 1846)   LORazepam injection 1 mg (1 mg Intravenous Given 10/18/22 1441)       Discharge Medication List as of 10/18/2022  6:29 PM                  Scribe Attestation:   Scribe #1: I performed the above scribed service and the documentation accurately describes the services I performed. I attest to  the accuracy of the note.     Attending:   Physician Attestation Statement for Scribe #1: I, Martha Deng Do, MD, personally performed the services described in this documentation, as scribed by Camilo Kline/Fouzia Strong, in my presence, and it is both accurate and complete.       Scribe Attestation:   Scribe #2: I performed the above scribed service and the documentation accurately describes the services I performed. I attest to the accuracy of the note.    Attending Attestation:           Physician Attestation for Scribe:    Physician Attestation Statement for Scribe #2: I, Vonnie Borden MD, reviewed documentation, as scribed by Grisel Hansen in my presence, and it is both accurate and complete. I also acknowledge and confirm the content of the note done by Scribe #1.         Clinical Impression       ICD-10-CM ICD-9-CM   1. Seizure  R56.9 780.39   2. Chest pain  R07.9 786.50       Disposition:   Disposition: Discharged  Condition: Stable       Vonnie Borden MD  10/19/22 6030

## 2022-10-19 ENCOUNTER — HOSPITAL ENCOUNTER (EMERGENCY)
Facility: HOSPITAL | Age: 22
Discharge: PSYCHIATRIC OR SUBSTANCE USE FACILITY | End: 2022-10-19
Attending: EMERGENCY MEDICINE
Payer: MEDICAID

## 2022-10-19 VITALS
SYSTOLIC BLOOD PRESSURE: 125 MMHG | TEMPERATURE: 99 F | DIASTOLIC BLOOD PRESSURE: 68 MMHG | RESPIRATION RATE: 18 BRPM | HEART RATE: 91 BPM | OXYGEN SATURATION: 98 %

## 2022-10-19 VITALS
TEMPERATURE: 96 F | RESPIRATION RATE: 18 BRPM | BODY MASS INDEX: 17.89 KG/M2 | HEART RATE: 79 BPM | OXYGEN SATURATION: 98 % | DIASTOLIC BLOOD PRESSURE: 67 MMHG | SYSTOLIC BLOOD PRESSURE: 119 MMHG | WEIGHT: 139.31 LBS

## 2022-10-19 DIAGNOSIS — R45.851 SUICIDAL IDEATION: Primary | ICD-10-CM

## 2022-10-19 DIAGNOSIS — F12.90 MARIJUANA USE: ICD-10-CM

## 2022-10-19 DIAGNOSIS — Z00.8 MEDICAL CLEARANCE FOR PSYCHIATRIC ADMISSION: ICD-10-CM

## 2022-10-19 LAB
ALBUMIN SERPL BCP-MCNC: 3.9 G/DL (ref 3.5–5.2)
ALP SERPL-CCNC: 72 U/L (ref 55–135)
ALT SERPL W/O P-5'-P-CCNC: 16 U/L (ref 10–44)
AMPHET+METHAMPHET UR QL: NEGATIVE
ANION GAP SERPL CALC-SCNC: 6 MMOL/L (ref 8–16)
APAP SERPL-MCNC: <3 UG/ML (ref 10–20)
AST SERPL-CCNC: 17 U/L (ref 10–40)
BARBITURATES UR QL SCN>200 NG/ML: NEGATIVE
BASOPHILS # BLD AUTO: 0.02 K/UL (ref 0–0.2)
BASOPHILS NFR BLD: 0.3 % (ref 0–1.9)
BENZODIAZ UR QL SCN>200 NG/ML: NEGATIVE
BILIRUB SERPL-MCNC: 0.5 MG/DL (ref 0.1–1)
BILIRUB UR QL STRIP: NEGATIVE
BUN SERPL-MCNC: 9 MG/DL (ref 6–20)
BZE UR QL SCN: NEGATIVE
CALCIUM SERPL-MCNC: 9.6 MG/DL (ref 8.7–10.5)
CANNABINOIDS UR QL SCN: ABNORMAL
CHLORIDE SERPL-SCNC: 106 MMOL/L (ref 95–110)
CLARITY UR: CLEAR
CO2 SERPL-SCNC: 26 MMOL/L (ref 23–29)
COLOR UR: YELLOW
CREAT SERPL-MCNC: 0.7 MG/DL (ref 0.5–1.4)
CREAT UR-MCNC: 327.5 MG/DL (ref 23–375)
DIFFERENTIAL METHOD: ABNORMAL
EOSINOPHIL # BLD AUTO: 0.1 K/UL (ref 0–0.5)
EOSINOPHIL NFR BLD: 1.9 % (ref 0–8)
ERYTHROCYTE [DISTWIDTH] IN BLOOD BY AUTOMATED COUNT: 13.1 % (ref 11.5–14.5)
EST. GFR  (NO RACE VARIABLE): >60 ML/MIN/1.73 M^2
ETHANOL SERPL-MCNC: <10 MG/DL
GLUCOSE SERPL-MCNC: 92 MG/DL (ref 70–110)
GLUCOSE UR QL STRIP: NEGATIVE
HCT VFR BLD AUTO: 40.3 % (ref 40–54)
HGB BLD-MCNC: 13.3 G/DL (ref 14–18)
HGB UR QL STRIP: NEGATIVE
IMM GRANULOCYTES # BLD AUTO: 0.03 K/UL (ref 0–0.04)
IMM GRANULOCYTES NFR BLD AUTO: 0.4 % (ref 0–0.5)
KETONES UR QL STRIP: NEGATIVE
LEUKOCYTE ESTERASE UR QL STRIP: NEGATIVE
LYMPHOCYTES # BLD AUTO: 1.8 K/UL (ref 1–4.8)
LYMPHOCYTES NFR BLD: 25.7 % (ref 18–48)
MCH RBC QN AUTO: 29.8 PG (ref 27–31)
MCHC RBC AUTO-ENTMCNC: 33 G/DL (ref 32–36)
MCV RBC AUTO: 90 FL (ref 82–98)
METHADONE UR QL SCN>300 NG/ML: NEGATIVE
MONOCYTES # BLD AUTO: 0.7 K/UL (ref 0.3–1)
MONOCYTES NFR BLD: 9.9 % (ref 4–15)
NEUTROPHILS # BLD AUTO: 4.2 K/UL (ref 1.8–7.7)
NEUTROPHILS NFR BLD: 61.8 % (ref 38–73)
NITRITE UR QL STRIP: NEGATIVE
NRBC BLD-RTO: 0 /100 WBC
OPIATES UR QL SCN: NEGATIVE
PCP UR QL SCN>25 NG/ML: NEGATIVE
PH UR STRIP: 6 [PH] (ref 5–8)
PLATELET # BLD AUTO: 251 K/UL (ref 150–450)
PMV BLD AUTO: 10 FL (ref 9.2–12.9)
POTASSIUM SERPL-SCNC: 3.9 MMOL/L (ref 3.5–5.1)
PROT SERPL-MCNC: 7.4 G/DL (ref 6–8.4)
PROT UR QL STRIP: ABNORMAL
RBC # BLD AUTO: 4.46 M/UL (ref 4.6–6.2)
SARS-COV-2 RDRP RESP QL NAA+PROBE: NEGATIVE
SODIUM SERPL-SCNC: 138 MMOL/L (ref 136–145)
SP GR UR STRIP: 1.02 (ref 1–1.03)
TOXICOLOGY INFORMATION: ABNORMAL
TSH SERPL DL<=0.005 MIU/L-ACNC: 1.84 UIU/ML (ref 0.4–4)
URN SPEC COLLECT METH UR: ABNORMAL
UROBILINOGEN UR STRIP-ACNC: NEGATIVE EU/DL
WBC # BLD AUTO: 6.84 K/UL (ref 3.9–12.7)

## 2022-10-19 PROCEDURE — 84443 ASSAY THYROID STIM HORMONE: CPT | Performed by: EMERGENCY MEDICINE

## 2022-10-19 PROCEDURE — 99285 EMERGENCY DEPT VISIT HI MDM: CPT

## 2022-10-19 PROCEDURE — 80143 DRUG ASSAY ACETAMINOPHEN: CPT | Performed by: EMERGENCY MEDICINE

## 2022-10-19 PROCEDURE — 81003 URINALYSIS AUTO W/O SCOPE: CPT | Mod: 59 | Performed by: EMERGENCY MEDICINE

## 2022-10-19 PROCEDURE — 85025 COMPLETE CBC W/AUTO DIFF WBC: CPT | Performed by: EMERGENCY MEDICINE

## 2022-10-19 PROCEDURE — 82077 ASSAY SPEC XCP UR&BREATH IA: CPT | Performed by: EMERGENCY MEDICINE

## 2022-10-19 PROCEDURE — U0002 COVID-19 LAB TEST NON-CDC: HCPCS | Performed by: EMERGENCY MEDICINE

## 2022-10-19 PROCEDURE — 80307 DRUG TEST PRSMV CHEM ANLYZR: CPT | Performed by: EMERGENCY MEDICINE

## 2022-10-19 PROCEDURE — 80053 COMPREHEN METABOLIC PANEL: CPT | Performed by: EMERGENCY MEDICINE

## 2022-10-19 NOTE — ED PROVIDER NOTES
SCRIBE #1 NOTE: I, Grisel Hansen, am scribing for, and in the presence of, Vonnie Borden MD. I have scribed the entire note.       History     Chief Complaint   Patient presents with    Seizures     Pt was just seen for same complaint. He states that he feels like he is going to have another seizure.     Review of patient's allergies indicates:   Allergen Reactions    D and c red no.7 Rash    [place] Rash         History of Present Illness     HPI    10/18/2022, 8:28 PM  History obtained from the patient      History of Present Illness: Jerardo Middleton is a 22 y.o. male patient with a PMHx of asthma, colitis, and seizures who presents to the Emergency Department for evaluation of a seizure. Pt was recently discharged from ED about an hour ago when he started having seizure-like activity in the waiting room. Pt states that he feels like another seizure is coming.  Symptoms are episodic and moderate in severity. No mitigating or exacerbating factors reported. No associated sxs reported. Patient denies any fever, chills, n/v/d, CP, lightheadedness, and all other sxs at this time. No prior Tx reported. No further complaints or concerns at this time.       Arrival mode: Personal vehicle      PCP: Primary Doctor No        Past Medical History:  Past Medical History:   Diagnosis Date    ADHD (attention deficit hyperactivity disorder)     Asthma     Bipolar disorder     Colitis     Drug abuse     Amphetamines and cannabis    Fetal alcohol syndrome     Foster child 2020    History of psychiatric hospitalization     Hx of psychiatric care     Impulse control disorder     Angela     Psychiatric exam requested by authority     Psychiatric problem     Schizoaffective disorder     Seizures     Sleep difficulties     Therapy        Past Surgical History:  No past surgical history on file.      Family History:  Family History   Family history unknown: Yes       Social History:  Social History     Tobacco Use    Smoking  status: Every Day     Packs/day: 0.50     Years: 7.00     Pack years: 3.50     Types: Cigarettes    Smokeless tobacco: Never   Substance and Sexual Activity    Alcohol use: No    Drug use: Not Currently    Sexual activity: Yes     Partners: Female     Birth control/protection: None        Review of Systems     Review of Systems   Constitutional:  Negative for chills and fever.   HENT:  Negative for sore throat.    Respiratory:  Negative for shortness of breath.    Cardiovascular:  Negative for chest pain.   Gastrointestinal:  Negative for diarrhea, nausea and vomiting.   Genitourinary:  Negative for dysuria.   Musculoskeletal:  Negative for back pain.   Skin:  Negative for rash.   Neurological:  Positive for seizures. Negative for weakness and light-headedness.   Hematological:  Does not bruise/bleed easily.   All other systems reviewed and are negative.     Physical Exam     Initial Vitals   BP Pulse Resp Temp SpO2   10/18/22 2110 10/18/22 2110 10/18/22 2110 10/19/22 0143 10/18/22 2110   (!) 160/74 67 13 98.5 °F (36.9 °C) 97 %      MAP       --                 Physical Exam  Nursing Notes and Vital Signs Reviewed.  Constitutional: Patient is in no acute distress. Well-developed and well-nourished.  Head: Atraumatic. Normocephalic.  Eyes: PERRL. EOM intact. Conjunctivae are not pale. No scleral icterus.  ENT: Mucous membranes are moist. Oropharynx is clear and symmetric.    Neck: Supple. Full ROM. No lymphadenopathy.  Cardiovascular: Regular rate. Regular rhythm. No murmurs, rubs, or gallops. Distal pulses are 2+ and symmetric.  Pulmonary/Chest: No respiratory distress. Clear to auscultation bilaterally. No wheezing or rales.  Abdominal: Soft and non-distended.  There is no tenderness.  No rebound, guarding, or rigidity. Good bowel sounds.  Genitourinary: No CVA tenderness  Musculoskeletal: Moves all extremities. No obvious deformities. No edema. No calf tenderness.  Skin: Warm and dry.  Neurological:  Alert, awake,  and appropriate.  Normal speech.  No acute focal neurological deficits are appreciated.  Psychiatric: Normal affect. Good eye contact. Appropriate in content.     ED Course   Procedures  ED Vital Signs:  Vitals:    10/18/22 2110 10/19/22 0143 10/19/22 0146   BP: (!) 160/74 125/68 125/68   Pulse: 67 91 91   Resp: 13 18 18   Temp:  98.5 °F (36.9 °C) 98.5 °F (36.9 °C)   TempSrc:  Oral Oral   SpO2: 97% 98% 98%       Abnormal Lab Results:  Labs Reviewed - No data to display     All Lab Results:       Imaging Results:  Imaging Results    None                   The Emergency Provider reviewed the vital signs and test results, which are outlined above.     ED Discussion     12:40 AM: Reassessed pt at this time.  Discussed with pt all pertinent ED information and results. Discussed pt dx and plan of tx. Gave pt all f/u and return to the ED instructions. All questions and concerns were addressed at this time. Pt expresses understanding of information and instructions, and is comfortable with plan to discharge. Pt is stable for discharge.    I discussed with patient and/or family/caretaker that evaluation in the ED does not suggest any emergent or life threatening medical conditions requiring immediate intervention beyond what was provided in the ED, and I believe patient is safe for discharge.  Regardless, an unremarkable evaluation in the ED does not preclude the development or presence of a serious of life threatening condition. As such, patient was instructed to return immediately for any worsening or change in current symptoms.                  ED Medication(s):  Medications - No data to display      Discharge Medication List as of 10/19/2022 12:38 AM                  Scribe Attestation:   Scribe #1: I performed the above scribed service and the documentation accurately describes the services I performed. I attest to the accuracy of the note.     Attending:   Physician Attestation Statement for Scribe #1: Vonnie AMADOR  MD Suha, personally performed the services described in this documentation, as scribed by Grisel Hansen, in my presence, and it is both accurate and complete.           Clinical Impression       ICD-10-CM ICD-9-CM   1. Seizure disorder  G40.909 345.90       Disposition:   Disposition: Discharged  Condition: Stable      Vonnie Borden MD  10/19/22 9217

## 2022-10-19 NOTE — ED PROVIDER NOTES
SCRIBE #1 NOTE: I, Vicky Waite, am scribing for, and in the presence of, Aaron Young Jr., MD. I have scribed the entire note.       History     Chief Complaint   Patient presents with    Suicidal     Pt states he has been here all night. He was checked into ED earlier for seizures and is now checking in for SI.     Review of patient's allergies indicates:   Allergen Reactions    D and c red no.7 Rash    [place] Rash         History of Present Illness     HPI    10/19/2022, 2:11 AM  History obtained from the patient      History of Present Illness: Jerardo Middleton is a 22 y.o. male patient with a PMHx of asthma, impulse control disorder, and bipolar disorder who presents to the Emergency Department for evaluation of suicidal ideation. Pt was seen twice yesterday for seizures and is now stating that he has suicidal thoughts.  Symptoms are constant and moderate in severity. No mitigating or exacerbating factors reported. No associated sxs. Patient denies any hallucinations, HI, fever, chills, nausea, and all other sxs at this time. No further complaints or concerns at this time.       Arrival mode: Personal vehicle    PCP: Primary Doctor No        Past Medical History:  Past Medical History:   Diagnosis Date    Asthma     Bipolar disorder     Colitis     Drug abuse     Amphetamines and cannabis    Fetal alcohol syndrome     Foster child 2020    Impulse control disorder        Past Surgical History:  History reviewed. No pertinent surgical history.      Family History:  History reviewed. No pertinent family history.    Social History:  Social History     Tobacco Use    Smoking status: Never    Smokeless tobacco: Never   Substance and Sexual Activity    Alcohol use: No    Drug use: Never    Sexual activity: Yes     Partners: Female        Review of Systems     Review of Systems   Constitutional:  Negative for chills and fever.   HENT:  Negative for sore throat.    Respiratory:  Negative for shortness of breath.     Cardiovascular:  Negative for chest pain.   Gastrointestinal:  Negative for nausea.   Genitourinary:  Negative for dysuria.   Musculoskeletal:  Negative for back pain.   Skin:  Negative for rash.   Neurological:  Negative for weakness.   Hematological:  Does not bruise/bleed easily.   Psychiatric/Behavioral:  Positive for suicidal ideas. Negative for hallucinations.         (-) HI   All other systems reviewed and are negative.     Physical Exam     Initial Vitals [10/19/22 0158]   BP Pulse Resp Temp SpO2   116/75 88 14 98.1 °F (36.7 °C) 98 %      MAP       --          Physical Exam  Nursing Notes and Vital Signs Reviewed.  Constitutional: Patient is in no acute distress. Well-developed and well-nourished.  Head: Atraumatic. Normocephalic.  Eyes: PERRL. EOM intact. Conjunctivae are not pale. No scleral icterus.  ENT: Mucous membranes are moist. Oropharynx is clear and symmetric.    Neck: Supple. Full ROM. No lymphadenopathy.  Cardiovascular: Regular rate. Regular rhythm. No murmurs, rubs, or gallops. Distal pulses are 2+ and symmetric.  Pulmonary/Chest: No respiratory distress. Clear to auscultation bilaterally. No wheezing or rales.  Abdominal: Soft and non-distended.  There is no tenderness.  No rebound, guarding, or rigidity.   Musculoskeletal: Moves all extremities. No obvious deformities. No edema.   Skin: Warm and dry.  Neurological:  Alert, awake, and appropriate.  Normal speech.  No acute focal neurological deficits are appreciated.  Psychiatric:               Behavior: cooperative, eye contact minimal              Mood and Affect: depressed affect              Suicidal Ideations: Yes              Suicidal Plan: No Specific Plan              Homicidal Ideations: No              Hallucinations: none       ED Course   Procedures  ED Vital Signs:  Vitals:    10/19/22 0158   BP: 116/75   Pulse: 88   Resp: 14   Temp: 98.1 °F (36.7 °C)   TempSrc: Oral   SpO2: 98%   Weight: 63.2 kg (139 lb 5.3 oz)       Abnormal  Lab Results:  Labs Reviewed   CBC W/ AUTO DIFFERENTIAL - Abnormal; Notable for the following components:       Result Value    RBC 4.46 (*)     Hemoglobin 13.3 (*)     All other components within normal limits   COMPREHENSIVE METABOLIC PANEL - Abnormal; Notable for the following components:    Anion Gap 6 (*)     All other components within normal limits   URINALYSIS, REFLEX TO URINE CULTURE - Abnormal; Notable for the following components:    Protein, UA Trace (*)     All other components within normal limits    Narrative:     Specimen Source->Urine   DRUG SCREEN PANEL, URINE EMERGENCY - Abnormal; Notable for the following components:    THC Presumptive Positive (*)     All other components within normal limits    Narrative:     Specimen Source->Urine   ACETAMINOPHEN LEVEL - Abnormal; Notable for the following components:    Acetaminophen (Tylenol), Serum <3.0 (*)     All other components within normal limits   TSH   ALCOHOL,MEDICAL (ETHANOL)   SARS-COV-2 RNA AMPLIFICATION, QUAL        All Lab Results:  Results for orders placed or performed during the hospital encounter of 10/19/22   CBC auto differential   Result Value Ref Range    WBC 6.84 3.90 - 12.70 K/uL    RBC 4.46 (L) 4.60 - 6.20 M/uL    Hemoglobin 13.3 (L) 14.0 - 18.0 g/dL    Hematocrit 40.3 40.0 - 54.0 %    MCV 90 82 - 98 fL    MCH 29.8 27.0 - 31.0 pg    MCHC 33.0 32.0 - 36.0 g/dL    RDW 13.1 11.5 - 14.5 %    Platelets 251 150 - 450 K/uL    MPV 10.0 9.2 - 12.9 fL    Immature Granulocytes 0.4 0.0 - 0.5 %    Gran # (ANC) 4.2 1.8 - 7.7 K/uL    Immature Grans (Abs) 0.03 0.00 - 0.04 K/uL    Lymph # 1.8 1.0 - 4.8 K/uL    Mono # 0.7 0.3 - 1.0 K/uL    Eos # 0.1 0.0 - 0.5 K/uL    Baso # 0.02 0.00 - 0.20 K/uL    nRBC 0 0 /100 WBC    Gran % 61.8 38.0 - 73.0 %    Lymph % 25.7 18.0 - 48.0 %    Mono % 9.9 4.0 - 15.0 %    Eosinophil % 1.9 0.0 - 8.0 %    Basophil % 0.3 0.0 - 1.9 %    Differential Method Automated    Comprehensive metabolic panel   Result Value Ref Range     Sodium 138 136 - 145 mmol/L    Potassium 3.9 3.5 - 5.1 mmol/L    Chloride 106 95 - 110 mmol/L    CO2 26 23 - 29 mmol/L    Glucose 92 70 - 110 mg/dL    BUN 9 6 - 20 mg/dL    Creatinine 0.7 0.5 - 1.4 mg/dL    Calcium 9.6 8.7 - 10.5 mg/dL    Total Protein 7.4 6.0 - 8.4 g/dL    Albumin 3.9 3.5 - 5.2 g/dL    Total Bilirubin 0.5 0.1 - 1.0 mg/dL    Alkaline Phosphatase 72 55 - 135 U/L    AST 17 10 - 40 U/L    ALT 16 10 - 44 U/L    Anion Gap 6 (L) 8 - 16 mmol/L    eGFR >60 >60 mL/min/1.73 m^2   TSH   Result Value Ref Range    TSH 1.844 0.400 - 4.000 uIU/mL   Urinalysis, Reflex to Urine Culture Urine, Clean Catch    Specimen: Urine   Result Value Ref Range    Specimen UA Urine, Clean Catch     Color, UA Yellow Yellow, Straw, Wendy    Appearance, UA Clear Clear    pH, UA 6.0 5.0 - 8.0    Specific Gravity, UA 1.025 1.005 - 1.030    Protein, UA Trace (A) Negative    Glucose, UA Negative Negative    Ketones, UA Negative Negative    Bilirubin (UA) Negative Negative    Occult Blood UA Negative Negative    Nitrite, UA Negative Negative    Urobilinogen, UA Negative <2.0 EU/dL    Leukocytes, UA Negative Negative   Drug screen panel, emergency   Result Value Ref Range    Benzodiazepines Negative Negative    Methadone metabolites Negative Negative    Cocaine (Metab.) Negative Negative    Opiate Scrn, Ur Negative Negative    Barbiturate Screen, Ur Negative Negative    Amphetamine Screen, Ur Negative Negative    THC Presumptive Positive (A) Negative    Phencyclidine Negative Negative    Creatinine, Urine 327.5 23.0 - 375.0 mg/dL    Toxicology Information SEE COMMENT    Ethanol   Result Value Ref Range    Alcohol, Serum <10 <10 mg/dL   Acetaminophen level   Result Value Ref Range    Acetaminophen (Tylenol), Serum <3.0 (L) 10.0 - 20.0 ug/mL   COVID-19 Rapid Screening   Result Value Ref Range    SARS-CoV-2 RNA, Amplification, Qual Negative Negative                    The Emergency Provider reviewed the vital signs and test results, which  are outlined above.     ED Discussion     2:12 AM: The PEC hold has been issued by Dr. Young at this time for SI.    5:00 AM: Pt has been medically cleared by Dr. Young at this time. Reassessed pt at this time. Pt is resting comfortably and appears in no acute distress. There are no psychiatric services offered at this facility. D/w pt all pertinent ED information and plan to transfer to psychiatric facility for psychiatric treatment. Pt verbalizes understanding. Patient being transferred by [facility] for ongoing personal protection en route. Pt has been made aware of all risks and benefits associated with transfer, including but not limited to death, MVC, loss of vital signs, and/or permanent disability. Benefits include ability to be treated at an inpatient psychiatric facility. Pt will be transported by personnel trained in CPR and CPI. Patient understands that there could be unforeseen motor vehicle accidents, inclement weather, or loss of vital signs that could result in potential death or permanent disability. All questions and complaints have been addressed at this time. Pt condition is stable at this time and is clear to transfer to psychiatric facility at this time.          Medical Decision Making:   Clinical Tests:   Lab Tests: Reviewed and Ordered         ED Medication(s):  Medications - No data to display    New Prescriptions    No medications on file               Scribe Attestation:   Scribe #1: I performed the above scribed service and the documentation accurately describes the services I performed. I attest to the accuracy of the note.     Attending:   Physician Attestation Statement for Scribe #1: I, Aaron Young Jr., MD, personally performed the services described in this documentation, as scribed by Vicky Waite, in my presence, and it is both accurate and complete.           Clinical Impression       ICD-10-CM ICD-9-CM   1. Suicidal ideation  R45.851 V62.84   2. Medical clearance for  psychiatric admission  Z00.8 V70.8   3. Marijuana use  F12.90 305.20       Disposition:   Disposition: Transferred  Condition: Stable       Aaron Young Jr., MD  10/19/22 0691

## 2023-01-23 PROBLEM — Z00.8 MEDICAL CLEARANCE FOR PSYCHIATRIC ADMISSION: Status: RESOLVED | Noted: 2021-11-21 | Resolved: 2023-01-23

## 2023-10-05 ENCOUNTER — HOSPITAL ENCOUNTER (EMERGENCY)
Facility: HOSPITAL | Age: 23
Discharge: PSYCHIATRIC OR SUBSTANCE USE FACILITY | End: 2023-10-05
Attending: INTERNAL MEDICINE
Payer: MEDICAID

## 2023-10-05 ENCOUNTER — HOSPITAL ENCOUNTER (EMERGENCY)
Facility: HOSPITAL | Age: 23
Discharge: PSYCHIATRIC OR SUBSTANCE USE FACILITY | End: 2023-10-06
Attending: EMERGENCY MEDICINE
Payer: MEDICAID

## 2023-10-05 VITALS
SYSTOLIC BLOOD PRESSURE: 113 MMHG | OXYGEN SATURATION: 97 % | HEIGHT: 74 IN | HEART RATE: 79 BPM | WEIGHT: 135 LBS | TEMPERATURE: 98 F | DIASTOLIC BLOOD PRESSURE: 55 MMHG | RESPIRATION RATE: 16 BRPM | BODY MASS INDEX: 17.32 KG/M2

## 2023-10-05 DIAGNOSIS — S59.901A INJURY OF RIGHT ELBOW: ICD-10-CM

## 2023-10-05 DIAGNOSIS — S50.01XA CONTUSION OF RIGHT ELBOW, INITIAL ENCOUNTER: ICD-10-CM

## 2023-10-05 DIAGNOSIS — R56.9 SEIZURE-LIKE ACTIVITY: Primary | ICD-10-CM

## 2023-10-05 DIAGNOSIS — S60.221A CONTUSION OF RIGHT HAND, INITIAL ENCOUNTER: Primary | ICD-10-CM

## 2023-10-05 DIAGNOSIS — R45.1 AGITATION: ICD-10-CM

## 2023-10-05 LAB
ALBUMIN SERPL BCP-MCNC: 4.1 G/DL (ref 3.5–5.2)
ALLENS TEST: ABNORMAL
ALP SERPL-CCNC: 85 U/L (ref 55–135)
ALT SERPL W/O P-5'-P-CCNC: 12 U/L (ref 10–44)
AMPHET+METHAMPHET UR QL: NEGATIVE
ANION GAP SERPL CALC-SCNC: 11 MMOL/L (ref 8–16)
AST SERPL-CCNC: 19 U/L (ref 10–40)
BARBITURATES UR QL SCN>200 NG/ML: NEGATIVE
BASOPHILS # BLD AUTO: 0.01 K/UL (ref 0–0.2)
BASOPHILS NFR BLD: 0.1 % (ref 0–1.9)
BENZODIAZ UR QL SCN>200 NG/ML: ABNORMAL
BILIRUB SERPL-MCNC: 0.2 MG/DL (ref 0.1–1)
BILIRUB UR QL STRIP: NEGATIVE
BUN SERPL-MCNC: 14 MG/DL (ref 6–20)
BZE UR QL SCN: NEGATIVE
CALCIUM SERPL-MCNC: 9.7 MG/DL (ref 8.7–10.5)
CANNABINOIDS UR QL SCN: NEGATIVE
CHLORIDE SERPL-SCNC: 104 MMOL/L (ref 95–110)
CK SERPL-CCNC: 270 U/L (ref 20–200)
CLARITY UR: CLEAR
CO2 SERPL-SCNC: 26 MMOL/L (ref 23–29)
COLOR UR: YELLOW
CREAT SERPL-MCNC: 0.8 MG/DL (ref 0.5–1.4)
CREAT UR-MCNC: 136.9 MG/DL (ref 23–375)
DELSYS: ABNORMAL
DIFFERENTIAL METHOD: NORMAL
EOSINOPHIL # BLD AUTO: 0.1 K/UL (ref 0–0.5)
EOSINOPHIL NFR BLD: 1.3 % (ref 0–8)
ERYTHROCYTE [DISTWIDTH] IN BLOOD BY AUTOMATED COUNT: 12 % (ref 11.5–14.5)
ERYTHROCYTE [SEDIMENTATION RATE] IN BLOOD BY WESTERGREN METHOD: 18 MM/H
EST. GFR  (NO RACE VARIABLE): >60 ML/MIN/1.73 M^2
FIO2: 30
GLUCOSE SERPL-MCNC: 89 MG/DL (ref 70–110)
GLUCOSE UR QL STRIP: NEGATIVE
HCO3 UR-SCNC: 26.8 MMOL/L (ref 24–28)
HCT VFR BLD AUTO: 43.6 % (ref 40–54)
HGB BLD-MCNC: 14.6 G/DL (ref 14–18)
HGB UR QL STRIP: NEGATIVE
IMM GRANULOCYTES # BLD AUTO: 0.02 K/UL (ref 0–0.04)
IMM GRANULOCYTES NFR BLD AUTO: 0.3 % (ref 0–0.5)
KETONES UR QL STRIP: ABNORMAL
LEUKOCYTE ESTERASE UR QL STRIP: NEGATIVE
LYMPHOCYTES # BLD AUTO: 1.3 K/UL (ref 1–4.8)
LYMPHOCYTES NFR BLD: 18.8 % (ref 18–48)
MAGNESIUM SERPL-MCNC: 2 MG/DL (ref 1.6–2.6)
MCH RBC QN AUTO: 29.3 PG (ref 27–31)
MCHC RBC AUTO-ENTMCNC: 33.5 G/DL (ref 32–36)
MCV RBC AUTO: 88 FL (ref 82–98)
METHADONE UR QL SCN>300 NG/ML: NEGATIVE
MODE: ABNORMAL
MONOCYTES # BLD AUTO: 0.6 K/UL (ref 0.3–1)
MONOCYTES NFR BLD: 8.6 % (ref 4–15)
NEUTROPHILS # BLD AUTO: 4.9 K/UL (ref 1.8–7.7)
NEUTROPHILS NFR BLD: 70.9 % (ref 38–73)
NITRITE UR QL STRIP: NEGATIVE
NRBC BLD-RTO: 0 /100 WBC
OPIATES UR QL SCN: NEGATIVE
PCO2 BLDA: 49.8 MMHG (ref 35–45)
PCP UR QL SCN>25 NG/ML: NEGATIVE
PEEP: 5
PH SMN: 7.34 [PH] (ref 7.35–7.45)
PH UR STRIP: 8 [PH] (ref 5–8)
PHOSPHATE SERPL-MCNC: 3.7 MG/DL (ref 2.7–4.5)
PLATELET # BLD AUTO: 234 K/UL (ref 150–450)
PMV BLD AUTO: 10.1 FL (ref 9.2–12.9)
PO2 BLDA: 164 MMHG (ref 80–100)
POC BE: 0 MMOL/L
POC SATURATED O2: 99 % (ref 95–100)
POC TCO2: 28 MMOL/L (ref 23–27)
POCT GLUCOSE: 83 MG/DL (ref 70–110)
POTASSIUM SERPL-SCNC: 4.3 MMOL/L (ref 3.5–5.1)
PROT SERPL-MCNC: 8.1 G/DL (ref 6–8.4)
PROT UR QL STRIP: ABNORMAL
RBC # BLD AUTO: 4.98 M/UL (ref 4.6–6.2)
SAMPLE: ABNORMAL
SITE: ABNORMAL
SODIUM SERPL-SCNC: 141 MMOL/L (ref 136–145)
SP GR UR STRIP: 1.02 (ref 1–1.03)
TOXICOLOGY INFORMATION: ABNORMAL
URN SPEC COLLECT METH UR: ABNORMAL
UROBILINOGEN UR STRIP-ACNC: NEGATIVE EU/DL
VALPROATE SERPL-MCNC: 59 UG/ML (ref 50–100)
VT: 500
WBC # BLD AUTO: 6.95 K/UL (ref 3.9–12.7)

## 2023-10-05 PROCEDURE — 99285 EMERGENCY DEPT VISIT HI MDM: CPT | Mod: 25

## 2023-10-05 PROCEDURE — 99291 CRITICAL CARE FIRST HOUR: CPT | Mod: 25

## 2023-10-05 PROCEDURE — 25000003 PHARM REV CODE 250

## 2023-10-05 PROCEDURE — 80164 ASSAY DIPROPYLACETIC ACD TOT: CPT | Performed by: EMERGENCY MEDICINE

## 2023-10-05 PROCEDURE — 85025 COMPLETE CBC W/AUTO DIFF WBC: CPT | Performed by: EMERGENCY MEDICINE

## 2023-10-05 PROCEDURE — 96361 HYDRATE IV INFUSION ADD-ON: CPT | Mod: 59

## 2023-10-05 PROCEDURE — 80307 DRUG TEST PRSMV CHEM ANLYZR: CPT | Performed by: EMERGENCY MEDICINE

## 2023-10-05 PROCEDURE — 51702 INSERT TEMP BLADDER CATH: CPT | Mod: 59

## 2023-10-05 PROCEDURE — 82803 BLOOD GASES ANY COMBINATION: CPT

## 2023-10-05 PROCEDURE — 94002 VENT MGMT INPAT INIT DAY: CPT | Mod: 59

## 2023-10-05 PROCEDURE — 96365 THER/PROPH/DIAG IV INF INIT: CPT | Mod: 59

## 2023-10-05 PROCEDURE — 80053 COMPREHEN METABOLIC PANEL: CPT | Performed by: EMERGENCY MEDICINE

## 2023-10-05 PROCEDURE — 96368 THER/DIAG CONCURRENT INF: CPT | Mod: 59

## 2023-10-05 PROCEDURE — 25000003 PHARM REV CODE 250: Performed by: EMERGENCY MEDICINE

## 2023-10-05 PROCEDURE — 36600 WITHDRAWAL OF ARTERIAL BLOOD: CPT | Mod: 59

## 2023-10-05 PROCEDURE — 81003 URINALYSIS AUTO W/O SCOPE: CPT | Mod: 59 | Performed by: EMERGENCY MEDICINE

## 2023-10-05 PROCEDURE — 63600175 PHARM REV CODE 636 W HCPCS: Performed by: EMERGENCY MEDICINE

## 2023-10-05 PROCEDURE — 96376 TX/PRO/DX INJ SAME DRUG ADON: CPT | Mod: 59

## 2023-10-05 PROCEDURE — 25000003 PHARM REV CODE 250: Performed by: INTERNAL MEDICINE

## 2023-10-05 PROCEDURE — 84100 ASSAY OF PHOSPHORUS: CPT | Performed by: EMERGENCY MEDICINE

## 2023-10-05 PROCEDURE — 63600175 PHARM REV CODE 636 W HCPCS

## 2023-10-05 PROCEDURE — 99900035 HC TECH TIME PER 15 MIN (STAT)

## 2023-10-05 PROCEDURE — 82962 GLUCOSE BLOOD TEST: CPT

## 2023-10-05 PROCEDURE — 82550 ASSAY OF CK (CPK): CPT | Performed by: EMERGENCY MEDICINE

## 2023-10-05 PROCEDURE — 31500 INSERT EMERGENCY AIRWAY: CPT

## 2023-10-05 PROCEDURE — 96374 THER/PROPH/DIAG INJ IV PUSH: CPT | Mod: 59

## 2023-10-05 PROCEDURE — 80183 DRUG SCRN QUANT OXCARBAZEPIN: CPT | Performed by: EMERGENCY MEDICINE

## 2023-10-05 PROCEDURE — 99283 EMERGENCY DEPT VISIT LOW MDM: CPT | Mod: 25

## 2023-10-05 PROCEDURE — 96366 THER/PROPH/DIAG IV INF ADDON: CPT | Mod: 59

## 2023-10-05 PROCEDURE — 83735 ASSAY OF MAGNESIUM: CPT | Performed by: EMERGENCY MEDICINE

## 2023-10-05 PROCEDURE — 96375 TX/PRO/DX INJ NEW DRUG ADDON: CPT | Mod: 59

## 2023-10-05 RX ORDER — LORAZEPAM 2 MG/ML
INJECTION INTRAMUSCULAR
Status: COMPLETED
Start: 2023-10-05 | End: 2023-10-05

## 2023-10-05 RX ORDER — ETOMIDATE 2 MG/ML
INJECTION INTRAVENOUS
Status: COMPLETED
Start: 2023-10-05 | End: 2023-10-05

## 2023-10-05 RX ORDER — IBUPROFEN 400 MG/1
800 TABLET ORAL
Status: COMPLETED | OUTPATIENT
Start: 2023-10-05 | End: 2023-10-05

## 2023-10-05 RX ORDER — SUCCINYLCHOLINE CHLORIDE 20 MG/ML
120 INJECTION INTRAMUSCULAR; INTRAVENOUS
Status: COMPLETED | OUTPATIENT
Start: 2023-10-05 | End: 2023-10-05

## 2023-10-05 RX ORDER — HALOPERIDOL 5 MG/ML
5 INJECTION INTRAMUSCULAR
Status: DISCONTINUED | OUTPATIENT
Start: 2023-10-05 | End: 2023-10-05

## 2023-10-05 RX ORDER — PROPOFOL 10 MG/ML
INJECTION, EMULSION INTRAVENOUS
Status: COMPLETED
Start: 2023-10-05 | End: 2023-10-05

## 2023-10-05 RX ORDER — LORAZEPAM 2 MG/ML
7 INJECTION INTRAMUSCULAR
Status: COMPLETED | OUTPATIENT
Start: 2023-10-05 | End: 2023-10-05

## 2023-10-05 RX ORDER — SUCCINYLCHOLINE CHLORIDE 20 MG/ML
INJECTION INTRAMUSCULAR; INTRAVENOUS
Status: COMPLETED
Start: 2023-10-05 | End: 2023-10-05

## 2023-10-05 RX ORDER — PROPOFOL 10 MG/ML
0-50 INJECTION, EMULSION INTRAVENOUS CONTINUOUS
Status: DISCONTINUED | OUTPATIENT
Start: 2023-10-05 | End: 2023-10-05

## 2023-10-05 RX ORDER — ETOMIDATE 2 MG/ML
20 INJECTION INTRAVENOUS
Status: COMPLETED | OUTPATIENT
Start: 2023-10-05 | End: 2023-10-05

## 2023-10-05 RX ORDER — HALOPERIDOL 5 MG/ML
5 INJECTION INTRAMUSCULAR
Status: COMPLETED | OUTPATIENT
Start: 2023-10-05 | End: 2023-10-05

## 2023-10-05 RX ORDER — IBUPROFEN 600 MG/1
600 TABLET ORAL EVERY 8 HOURS PRN
Qty: 30 TABLET | Refills: 0 | Status: SHIPPED | OUTPATIENT
Start: 2023-10-05 | End: 2023-10-25 | Stop reason: ALTCHOICE

## 2023-10-05 RX ADMIN — ETOMIDATE 20 MG: 2 INJECTION INTRAVENOUS at 05:10

## 2023-10-05 RX ADMIN — PROPOFOL 10 MCG/KG/MIN: 10 INJECTION, EMULSION INTRAVENOUS at 05:10

## 2023-10-05 RX ADMIN — SODIUM CHLORIDE 1000 ML: 9 INJECTION, SOLUTION INTRAVENOUS at 06:10

## 2023-10-05 RX ADMIN — SUCCINYLCHOLINE CHLORIDE 120 MG: 20 INJECTION, SOLUTION INTRAMUSCULAR; INTRAVENOUS at 05:10

## 2023-10-05 RX ADMIN — LORAZEPAM 2 MG: 2 INJECTION INTRAMUSCULAR; INTRAVENOUS at 04:10

## 2023-10-05 RX ADMIN — IBUPROFEN 800 MG: 400 TABLET ORAL at 01:10

## 2023-10-05 RX ADMIN — LORAZEPAM 7 MG: 2 INJECTION INTRAMUSCULAR; INTRAVENOUS at 05:10

## 2023-10-05 RX ADMIN — LORAZEPAM 7 MG: 2 INJECTION INTRAMUSCULAR at 05:10

## 2023-10-05 RX ADMIN — LORAZEPAM 7 MG: 2 INJECTION INTRAMUSCULAR; INTRAVENOUS at 04:10

## 2023-10-05 RX ADMIN — HALOPERIDOL LACTATE 5 MG: 5 INJECTION, SOLUTION INTRAMUSCULAR at 10:10

## 2023-10-05 RX ADMIN — SUCCINYLCHOLINE CHLORIDE 120 MG: 20 INJECTION INTRAMUSCULAR; INTRAVENOUS at 05:10

## 2023-10-05 RX ADMIN — LEVETIRACETAM 4000 MG: 100 INJECTION, SOLUTION INTRAVENOUS at 05:10

## 2023-10-05 NOTE — ED TRIAGE NOTES
Pt BIB EMS from [facility]. Pt with current PEC in place. Hx of epilepsy. Reports seizure activity today. Active seizure in progress upon arrival to facility per EMS. 5mg versed give per EMS. Pt asleep upon arrival. Opens eyes to touch with incomprehensible sounds. RR unlabored at present. Connected to cardiac and O2 monitor. Sitter at bedside. Seizure pads in place.

## 2023-10-05 NOTE — ED NOTES
ED tech notifies staff that patient's seizing. RN'S and MD to bedside. Pt having tonic clonic seizure, but not as volatile. Placed on NRB, suction at bedside, seizure pads already in place. Dr. Rodriguez wants 2mg of Ativan given.

## 2023-10-05 NOTE — ED NOTES
X-ray at bedside to verify ET and OG tube placement. Patient is not yet fully sedated, moving around in bed attempting to pull ET tube. MD at bedside, will place soft wrist restraint order.

## 2023-10-05 NOTE — ED NOTES
Pt with seizure activity again. Lasting approximately 2 min. More ativan given per Dr. Rodriguez's orders.

## 2023-10-05 NOTE — ED NOTES
Pt actively seizing. Dr. Rodriguez at bedside. NRB in place. Ativan given. Seizure lasting approximately 10 min. Pt moved to room 17 for possible intubation.

## 2023-10-05 NOTE — ED PROVIDER NOTES
Encounter Date: 10/4/2023    SCRIBE #1 NOTE: I, Yunior Rivas, am scribing for, and in the presence of,  Torey Cleveland MD.       History     Chief Complaint   Patient presents with    Hand Injury     Patient arrives via EMS from [facility] with right hand and elbow injury. He was admitted there tonight and became agitated during intake process and began punching and elbowing wall. Pain to right hand and elbow, and x-ray services are not available until tomorrow morning. CEC patient. Calm and cooperative in triage.     Jerardo Middleton is a 23 y.o. male with a PMHx of ADHD, bipolar disorder, impulse control disorder, seizures, schizoaffective disorder, substance abuse, who presents to the ED via EMS for evaluation of hand injury today. [facility] comes [facility] after he punched a wall with his right hand and right elbow. He was admitted tonight for SI and became agitated during intake process. He currently reports pain to the right hand and right elbow. He is currently under a CEC. No medications taken PTA. No alleviating or exacerbating factors noted. Denies numbness, weakness, or other associated symptoms.     The history is provided by the patient and the EMS personnel. No  was used.     Review of patient's allergies indicates:   Allergen Reactions    D and c red no.7 Rash    [place] Rash     Past Medical History:   Diagnosis Date    ADHD (attention deficit hyperactivity disorder)     Asthma     Bipolar disorder     Colitis     Drug abuse     Amphetamines and cannabis    Fetal alcohol syndrome     Foster child 2020    History of psychiatric hospitalization     Hx of psychiatric care     Impulse control disorder     Angela     Psychiatric exam requested by authority     Psychiatric problem     Schizoaffective disorder     Seizures     Sleep difficulties     Therapy      No past surgical history on file.  Family History   Family history unknown: Yes     Social History      Tobacco Use    Smoking status: Every Day     Current packs/day: 0.50     Average packs/day: 0.5 packs/day for 7.0 years (3.5 ttl pk-yrs)     Types: Cigarettes    Smokeless tobacco: Never   Substance Use Topics    Alcohol use: No    Drug use: Not Currently     Review of Systems   Constitutional:  Negative for fever.   HENT:  Negative for sore throat.    Respiratory:  Negative for shortness of breath.    Cardiovascular:  Negative for chest pain.   Gastrointestinal:  Negative for diarrhea, nausea and vomiting.   Genitourinary:  Negative for dysuria.   Musculoskeletal:  Positive for arthralgias and myalgias. Negative for back pain.   Skin:  Negative for rash.   Neurological:  Negative for weakness and headaches.   Psychiatric/Behavioral:  Positive for self-injury. Negative for behavioral problems.    All other systems reviewed and are negative.      Physical Exam     Initial Vitals [10/05/23 0002]   BP Pulse Resp Temp SpO2   128/86 82 16 98.2 °F (36.8 °C) 96 %      MAP       --         Physical Exam    Nursing note and vitals reviewed.  Constitutional: He appears well-developed and well-nourished.   HENT:   Head: Normocephalic and atraumatic.   Eyes: Conjunctivae are normal.   Neck: Neck supple.   Normal range of motion.  Cardiovascular:  Normal rate, regular rhythm and normal heart sounds.     Exam reveals no gallop and no friction rub.       No murmur heard.  Pulmonary/Chest: Breath sounds normal. No respiratory distress. He has no wheezes. He has no rhonchi. He has no rales.   Abdominal: Abdomen is soft. There is no abdominal tenderness.   Musculoskeletal:         General: No edema. Normal range of motion.      Cervical back: Normal range of motion and neck supple.      Comments: Swelling to the 2nd and 3rd MP joint of the right hand with tenderness to palpation and pain upon movement. Edema to right elbow with tenderness to palpation and pain upon movement. Bilateral upper extremities neurovascularly intact.       Neurological: He is alert and oriented to person, place, and time. GCS score is 15. GCS eye subscore is 4. GCS verbal subscore is 5. GCS motor subscore is 6.   Skin: Skin is warm and dry.   Psychiatric: He has a normal mood and affect.         ED Course   Procedures  Labs Reviewed - No data to display       Imaging Results              X-Ray Hand 3 view Right (Final result)  Result time 10/05/23 00:55:01      Final result by Rafa Gonzalez MD (10/05/23 00:55:01)                   Impression:      No acute osseous abnormality identified.      Electronically signed by: Rafa Gonzalez MD  Date:    10/05/2023  Time:    00:55               Narrative:    EXAMINATION:  XR HAND COMPLETE 3 VIEW RIGHT    CLINICAL HISTORY:  Right hand injury;    TECHNIQUE:  PA, lateral, and oblique views of the right hand were performed.    COMPARISON:  January 2022.    FINDINGS:  No evidence of acute displaced fracture, dislocation, or osseous destructive process.  Soft tissue swelling is seen at the dorsal aspect of the MCP region.  No radiopaque retained foreign body seen.                                       X-Ray Elbow Complete Right (Final result)  Result time 10/05/23 00:50:56      Final result by Rafa Gonzalez MD (10/05/23 00:50:56)                   Impression:      No acute osseous abnormality identified.      Electronically signed by: Rafa Gonzalez MD  Date:    10/05/2023  Time:    00:50               Narrative:    EXAMINATION:  XR ELBOW COMPLETE 3 VIEW RIGHT    CLINICAL HISTORY:  . Unspecified injury of right elbow, initial encounter    TECHNIQUE:  AP, lateral, and oblique views of the right elbow were performed.    COMPARISON:  None    FINDINGS:  No evidence of acute displaced fracture, dislocation, or osseous destructive process.  No significant elbow joint effusion.                                       Medications   ibuprofen tablet 800 mg (has no administration in time range)     Medical Decision Making  Jerardo  Kane is a 23 y.o. male with a PMHx of ADHD, bipolar disorder, impulse control disorder, seizures, schizoaffective disorder, substance abuse, who presents to the ED via EMS for evaluation of hand injury today. Patient comes from [facility] after he punched a wall with his right hand and right elbow. He was admitted tonight for SI and became agitated during intake process. He currently reports pain to the right hand and right elbow. He is currently under a CEC. No medications taken PTA. No alleviating or exacerbating factors noted. Denies numbness, weakness, or other associated symptoms.   Course of ED stay:  X-rays of right hand and elbow showed no acute abnormalities.  Patient received ibuprofen/Tylenol in the emergency department as well as instructions for contusion.  He was advised to follow-up with his primary care physician within the next 2 weeks for re-evaluation/return to the emergency department if condition worsens.    Amount and/or Complexity of Data Reviewed  Radiology: ordered.            Scribe Attestation:   Scribe #1: I performed the above scribed service and the documentation accurately describes the services I performed. I attest to the accuracy of the note.                        This document was produced by a scribe under my direction and in my presence. I agree with the content of the note and have made any necessary edits.     Dr. Cleveland    10/05/2023 1:03 AM      Clinical Impression:   Final diagnoses:  [S59.901A] Injury of right elbow  [S60.221A] Contusion of right hand, initial encounter (Primary)  [S50.01XA] Contusion of right elbow, initial encounter        ED Disposition Condition    Discharge Stable          ED Prescriptions       Medication Sig Dispense Start Date End Date Auth. Provider    ibuprofen (ADVIL,MOTRIN) 600 MG tablet Take 1 tablet (600 mg total) by mouth every 8 (eight) hours as needed for Pain. 30 tablet 10/5/2023 -- Torey Cleveland MD           Follow-up Information    None          Torey Cleveland MD  10/05/23 0106

## 2023-10-05 NOTE — ED NOTES
Pt c/o R hand and elbow pain s/p punching a wall. Pt states increased pain to R hand when trying to make a fist. Pt has swelling to R hand and R elbow. CEC status per [facility]. Pending xray

## 2023-10-05 NOTE — ED PROVIDER NOTES
Encounter Date: 10/5/2023    SCRIBE #1 NOTE: I, Temitope Grady, am scribing for, and in the presence of,  Ly Rodriguez MD.       History     Chief Complaint   Patient presents with    Seizures     Pt arrived via  EMS   [facility] call pt actively seizing [facility] gave 3.25 ativan upon EMS arrival pt was still actively seizing  EMS administered 5 mg of ativan IM  Pt is currently Post Ictal  Pt is able to verbalize his name.       Time seen by provider: 4:43 PM.     24 yo M with a PMHx of schizophrenia, epilepsy, depression, prior suicide attempt, fetal alcohol syndrome, polysubstance abuse, spina bifida, intellectual disability, presenting to the ED via [facility] under [facility] for persistent seizures. Additional history is provided by independent historian, EMS, report he started experiencing a seizure at the NH, upon EMS arrival to scene they administered 5 versed IM and 3.25 ativan. EMS state this episode lasted around 10 minutes. Upon arrival to our facility he was in postictal state but able to verbalize his name.     After arrival at our facility, at 16:40, he started experiencing seizure activity that lasted till 16:49 PM.     Per medical records at bedside, he was recently admitted to ICU at [facility] from a U for status epilepticus. He had 5 additional seizures while in the ICU. They had to intubate him, given propofol, dilaudid, and fentanyl. Subsequently discharged back to [facility]. He is on trileptal 600 mg BID, Seroquel 400 mg at night, Depakote 500 mg BID, and trazodone 100 mg qhs.     The history is provided by the EMS personnel and medical records. The history is limited by the condition of the patient.     Review of patient's allergies indicates:   Allergen Reactions    D and c red no.7 Rash    [place] Rash     Past Medical History:   Diagnosis Date    ADHD (attention deficit hyperactivity disorder)     Asthma     Bipolar disorder     Colitis     Drug abuse     Amphetamines and  cannabis    Fetal alcohol syndrome     Foster child 2020    History of psychiatric hospitalization     Hx of psychiatric care     Impulse control disorder     Angela     Psychiatric exam requested by authority     Psychiatric problem     Schizoaffective disorder     Seizures     Sleep difficulties     Therapy      No past surgical history on file.  Family History   Family history unknown: Yes     Social History     Tobacco Use    Smoking status: Every Day     Current packs/day: 0.50     Average packs/day: 0.5 packs/day for 7.0 years (3.5 ttl pk-yrs)     Types: Cigarettes    Smokeless tobacco: Never   Substance Use Topics    Alcohol use: No    Drug use: Not Currently     Review of Systems   Unable to perform ROS: Acuity of condition       Physical Exam     Initial Vitals [10/05/23 1614]   BP Pulse Resp Temp SpO2   119/69 89 18 98.7 °F (37.1 °C) 97 %      MAP       --         Physical Exam    Nursing note and vitals reviewed.  Constitutional: He appears well-developed and well-nourished. He is not diaphoretic. No distress.   HENT:   Head: Normocephalic and atraumatic.   Mouth/Throat: Oropharynx is clear and moist.   Eyes: EOM are normal. Pupils are equal, round, and reactive to light.   Neck: Neck supple.   Cardiovascular:  Normal rate and regular rhythm.           Pulmonary/Chest: Breath sounds normal. No respiratory distress.   Abdominal: Abdomen is soft. Bowel sounds are normal.   Musculoskeletal:         General: No edema.      Cervical back: Neck supple.     Neurological: He is alert. GCS eye subscore is 4. GCS verbal subscore is 4. GCS motor subscore is 6.   Patient moves all 4 extremities, cooperates with commands to squeeze my hands   Skin: Skin is warm and dry.         ED Course   Intubation    Date/Time: 10/5/2023 8:53 PM  Location procedure was performed: [facility] EMERGENCY DEPARTMENT    Performed by: Ly Rodriguez MD  Authorized by: Ly Rodriguez MD  Consent Done: Emergent Situation  Indications:  airway protection  Intubation method: video-assisted  Patient status: paralyzed (RSI)  Preoxygenation: nonrebreather mask  Sedatives: etomidate  Paralytic: succinylcholine  Laryngoscope size: Mac 4  Tube size: 7.5 mm  Tube type: cuffed  Number of attempts: 1  Cords visualized: yes  Post-procedure assessment: chest rise and CO2 detector  Breath sounds: equal and absent over the epigastrium  Cuff inflated: yes  ETT to lip: 27 cm  Tube secured with: ETT leal  Chest x-ray findings: endotracheal tube in appropriate position  Patient tolerance: Patient tolerated the procedure well with no immediate complications      Critical Care    Date/Time: 10/5/2023 8:55 PM    Performed by: Ly Rodriguez MD  Authorized by: Ly Rodriguez MD  Total critical care time (exclusive of procedural time) : 0 minutes  Comments: Please put in 60 minutes of critical care due to patient having a high risk of CNS failure.   Separate from teaching and exclusive of procedure and ekg time  Includes:  Time at bedside  Time reviewing test results  Time discussing case with staff  Time documenting the medical record  Time spent with consults  Management            Labs Reviewed   CK - Abnormal; Notable for the following components:       Result Value     (*)     All other components within normal limits   URINALYSIS, REFLEX TO URINE CULTURE - Abnormal; Notable for the following components:    Protein, UA Trace (*)     Ketones, UA Trace (*)     All other components within normal limits    Narrative:     Specimen Source->Urine   DRUG SCREEN PANEL, URINE EMERGENCY - Abnormal; Notable for the following components:    Benzodiazepines Presumptive Positive (*)     All other components within normal limits    Narrative:     Specimen Source->Urine   ISTAT PROCEDURE - Abnormal; Notable for the following components:    POC PH 7.340 (*)     POC PCO2 49.8 (*)     POC PO2 164 (*)     POC TCO2 28 (*)     All other components within normal limits    CBC W/ AUTO DIFFERENTIAL   COMPREHENSIVE METABOLIC PANEL   VALPROIC ACID   MAGNESIUM   PHOSPHORUS   OXCARBAZEPINE METABOLITE (MHC)   POCT GLUCOSE     EKG Readings: (Independently Interpreted)   Normal sinus rhythm, rate 95 beats per minute, normal ND interval,  milliseconds, no STEMI.       Imaging Results              CT Head Without Contrast (Final result)  Result time 10/05/23 20:32:01      Final result by Rafa Gonzalez MD (10/05/23 20:32:01)                   Impression:      No acute intracranial abnormalities identified.      Electronically signed by: Rafa Gonzalez MD  Date:    10/05/2023  Time:    20:32               Narrative:    EXAMINATION:  CT HEAD WITHOUT CONTRAST    CLINICAL HISTORY:  status epilepticus;    TECHNIQUE:  Low dose axial images were obtained through the head.  Coronal and sagittal reformations were also performed. Contrast was not administered.    COMPARISON:  CT head and MRI brain from January 2022.    FINDINGS:  No evidence of acute/recent major vascular distribution cerebral infarction, intraparenchymal hemorrhage, or intra-axial space occupying lesion. The ventricular system is normal in size and configuration with no evidence of hydrocephalus. No effacement of the skull-base cisterns. No abnormal extra-axial fluid collections or blood products. Visualized paranasal sinuses and mastoid air cells are clear. The calvarium shows no significant abnormality.                                       X-Ray Chest AP Portable (Final result)  Result time 10/05/23 18:16:51      Final result by James Merino MD (10/05/23 18:16:51)                   Impression:      No acute radiographic abnormality.      Electronically signed by: James Merino  Date:    10/05/2023  Time:    18:16               Narrative:    EXAMINATION:  XR CHEST AP PORTABLE    CLINICAL HISTORY:  status epilepticus;    TECHNIQUE:  Single frontal view of the chest was  performed.    COMPARISON:  10/18/2022    FINDINGS:  Cardiac silhouette is within normal limits.  Endotracheal tube is present.  Positioning is adequate.  NG tube is present with tip overlying the stomach.    Lungs are clear.  No effusion or pneumothorax.  No mass or consolidation.  No acute osseous abnormality.                                       Medications   LORazepam (ATIVAN) 2 mg/mL injection (2 mg  Given 10/5/23 1641)   LORazepam (ATIVAN) 2 mg/mL injection (2 mg  Given 10/5/23 1643)   LORazepam (ATIVAN) 2 mg/mL injection (7 mg  Given 10/5/23 1651)   LORazepam (ATIVAN) 2 mg/mL injection (2 mg  Given 10/5/23 1642)   etomidate injection 20 mg (20 mg Intravenous Given 10/5/23 1738)   succinylcholine injection 120 mg (120 mg Intravenous Given 10/5/23 1738)   levETIRAcetam (Keppra) 4,000 mg in dextrose 5 % (D5W) 500 mL IVPB (0 mg Intravenous Stopped 10/5/23 1817)   LORazepam injection 7 mg (7 mg Intravenous Given 10/5/23 1731)   sodium chloride 0.9% bolus 1,000 mL 1,000 mL (0 mLs Intravenous Stopped 10/5/23 1955)   haloperidol lactate injection 5 mg (5 mg Intravenous Given 10/5/23 2204)     Medical Decision Making  23-year-old male [facility] with [facility]-like activity.  My initial chart review, patient with history of epilepsy amongst other behavioral health diagnoses.  History is extremely limited and was obtained primarily from reviewing [facility] chart.  I was called to bedside for seizure-like activity.  Upon my assessment, patient having what appears to be tonic clonic movements.  He did not respond to sternal rub.  He was given Ativan.  Will obtain blood work, monitor for further seizure activity, obtain serum levels of antiepileptics.    Amount and/or Complexity of Data Reviewed  External Data Reviewed:      Details: Note from Dr. Diop 1/2022: Long-term EEG showed no epileptiform activity. 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  Labs: ordered. Decision-making details documented in ED Course.     Details: Labs are within acceptable limits including bicarb level.  There is no leukocytosis.  This is not consistent with multiple repeated seizures.  Radiology: ordered. Decision-making details documented in ED Course.     Details: No acute finding noted on CT head  ECG/medicine tests: ordered and independent interpretation performed. Decision-making details documented in ED Course.  Discussion of management or test interpretation with external provider(s): The patient was intubated for airway protection, he received high doses of Ativan for seizure-like activity and continued to have seizure activity.  He was loaded with Keppra.  The case was reviewed with the neuro critical Care doctor, Dr. Haley Melendez, who was able to review previous records.  She states patient has been evaluated multiple times [facility] for the seizure-like activity, he has had multiple EEGs, seizure activity while on continuous EEG monitor failed to capture epileptic activity.  He is diagnosed with psychogenic nonepileptic seizures.  Plan for extubation, will monitor postextubation, I did call and speak with the nurse at [facility] reviewed Dr. Alexandra's recommendation that further benzodiazepine use be avoided for seizure-like activity and to continue with behavioral health modification and treatment.    Risk  Prescription drug management.    Patient given haldol for acute agitation, care signed out to Dr. Negron pending further monitoring after extubation. Plan for discharge back to [facility].     Ly Rodriguez MD   10:17 PM          Scribe Attestation:   Scribe #1: I performed the above scribed service and the documentation accurately describes the services I performed. I attest to the accuracy of the note.        ED Course as of 10/06/23 0946   Thu Oct 05, 2023   1652 Seizure activity  ceased at 16:49. Started again at 16:50 and ended at 16:52. [JL]   1700 Nursing staff reports seizure started again at 17:00. Given 0.1 mg/kg ativan, lasted 2 minutes.  [LH]   1729 Seizure activity started again at 17:28, given 7 mg ativan/ [LH]   1914 I discussed the case with Dr. Melendez (neuro critical care)- she is thoroughly reviewed the patient's chart and has notified me that the patient has PNES, psychogenic nonepileptic seizure.  She reports the patient has had these episodes multiple times while being monitored on a continuous EEG without epileptiform activity.  She agrees with extubating the patient, discharging back for continued behavioral health intervention as this is likely the source of his pseudoseizures. [LH]   2019 Discussed care (including recommendations from the neurocritical care MD) with Judie GUADALUPE at [facility].  [LH]   2158 Patient now verbally abusive towards staff, rolling around in bed, pulled off all his leads.  Patient has been redirected verbally multiple times.  Will give Haldol IM. [LH]   Fri Oct 06, 2023   0112 Handoff from Dr. Rodriguez.  Patient has been resting comfortably in the emergency department.  He was extubated.  He is a history of psychogenic nonepileptic seizures.  No further seizure-like activity in the emergency department.  He has been observed in the emergency department for greater than 4 hours after extubation in his without any respiratory distress.  Vitals are stable and he otherwise looks well re-evaluation.  He was resting comfortably and easily arousable.  Plan is to transfer patient back to [facility].  Patient made aware of plan. [CC]   8233 [facility] has informed us that their medical director has to sign off on transfer back to the facility.  The medical director comes on at 7:30 a.m.. [CC]      ED Course User Index  [CC] Miguel Negron MD  [JL] Temitope Grady  [LH] Ly Rodriguez MD       Medically cleared for psychiatry placement: 10/6/2023   1:13 AM          I, Ly Rodriguez MD, personally performed the services described in this documentation. All medical record entries made by the scribe were at my direction and in my presence. I have reviewed the chart and agree that the record reflects my personal performance and is accurate and complete.    This dictation has been generated using M-Modal Fluency Direct dictation; some phonetic errors may occur.     Was accepted back to [facility] at 9:48 a.m..        Clinical Impression:   Final diagnoses:  [R56.9] Seizure-like activity (Primary)  [R45.1] Agitation        ED Disposition Condition    Transfer to Psych Facility Stable          ED Prescriptions    None       Follow-up Information       Follow up With Specialties Details Why Contact Info    [facility] -   As needed [address] [place] [address]  [phone]      [facility] [place] [facility] Emergency Medicine  As needed, If symptoms worsen [address]  [facility] [id number] [phone]             King Lowery MD  10/06/23 0913

## 2023-10-06 VITALS
OXYGEN SATURATION: 96 % | SYSTOLIC BLOOD PRESSURE: 122 MMHG | DIASTOLIC BLOOD PRESSURE: 59 MMHG | BODY MASS INDEX: 22.43 KG/M2 | WEIGHT: 174.69 LBS | TEMPERATURE: 98 F | RESPIRATION RATE: 16 BRPM | HEART RATE: 120 BPM

## 2023-10-06 PROCEDURE — 99215 PR OFFICE/OUTPT VISIT, EST, LEVL V, 40-54 MIN: ICD-10-PCS | Mod: ,,, | Performed by: STUDENT IN AN ORGANIZED HEALTH CARE EDUCATION/TRAINING PROGRAM

## 2023-10-06 PROCEDURE — 99215 OFFICE O/P EST HI 40 MIN: CPT | Mod: ,,, | Performed by: STUDENT IN AN ORGANIZED HEALTH CARE EDUCATION/TRAINING PROGRAM

## 2023-10-06 NOTE — ED NOTES
Pt moved to bed 13 for security and safety purposes. Pt ambulated to bed without incident. Pt offered water but denies. Pt  cooperating with ED staff. Security present.

## 2023-10-06 NOTE — ED NOTES
Spoke with charge ANAYELI Cassidy at [facility], pt will need to be cleared and signed off by the facilities medical director prior to re acceptance. Informed Dr Negron, transfer center and ED charge of delayed transfer.

## 2023-10-06 NOTE — ASSESSMENT & PLAN NOTE
Ms. Middleton is a 23 y.o. male with significant extensive psychiatric history - seizure disorder - PNEE, Asthma, Bipolar disorder, Substance abuse issues (Amphetamines & cannabis), Fetal alcohol syndrome, and an Impulse control disorder with multiple presentations for self-harmful, odd and suicidal behavior with admission concerns for seizure like activity. And neurology has been consulted for the same.     As per ED note -  Patient comes from [facility] after he punched a wall with his right hand and right elbow. He was admitted tonight for SI and became agitated during intake process. He currently reports pain to the right hand and right elbow. He is currently under a CEC. No medications taken PTA. No alleviating or exacerbating factors noted. Denies numbness, weakness, or other associated symptoms. Course of ED stay:X-rays of right hand and elbow showed no acute abnormalities.  Patient received ibuprofen/Tylenol in the emergency department as well as instructions for contusion.  He was advised to follow-up with his primary care physician within the next 2 weeks for re-evaluation/return to the emergency department if condition worsens. As per report - had multiple events concerning for seizures vs pseudoseizures - with control post multiple PRN ativans / with transient need for intubation / respiratory support. Unclear on the description of the semiology - appears behavioral / thrashing activities with no clear tonic clonic activity / tongue biting / loss of bowel or bladder continence. Review of prior report - similar presentations / ICU evaluation prior / including EEG capture of events suggestive of non-epileptiform / with unrevealing investigations.      Currently patient is awake / alert / less interactive / however minimal interactions appropriate. No focal motor or sensory or cranial nerve deficits.     Recs:  Events suspected in the spectrum of behavioral outbursts / psychogenic non  epileptiform activity  No further MRI imaging / EEG investigations needed at this standpoint. Consider if any clear tonic clonic event witnessed    - Prn ativan 1 mg IV if GTC or focal complex with secondary generalization > 2 mins / repeat ativan every 2 mins to .1mg/kg for uncontrolled seizures  - Continue home medications including trileptal / needs weans off on op basis - as per primary neurologist evalution     -- Correct lytes as needed / control infection if any / avoid hypoxia or hypercapnia / avoid hypoglycemia   -- Correct any nutritional deficiencies / correct anemia / provide nutritional support as appropriate / ambulate when appropriate - PT/OT recs   -- seizure / delirium precautions

## 2023-10-06 NOTE — ED NOTES
"Pt is becoming verbally aggressive to ED staff. Pt states " y'all just gonna let me die and not help me". Informed of ED workup and results. Pt states " yall don't know shit". Attempted to redirect pt multiple times. Pt's verbal aggression is escalating. Dr Rodriguez at bedside to attempt redirection and inform of ED workup. Pt continues to be in denial about Sz workup. Meds ordered per Dr Rodriguez  "

## 2023-10-06 NOTE — ED NOTES
Pt transported back to [facility] with Mary on stretcher. Pt calm and cooperative at this time. Pt escorted out with ems personnel, tech, and security.

## 2023-10-06 NOTE — ED NOTES
Pt resting quietly NAD noted. Easily arousable to voice. Pt is calm and cooperative at this time. Dr Negron at bedside to [facility] for d/c.

## 2023-10-06 NOTE — ED NOTES
Assumed care. Pt intubated and sedated with propofol infusing. Sitter at bedside. CEC status remains. Sz pads in place. Per Dr Rodriguez will attempt extubation shortly after consult with Neuro. VSS on monitor. See assessments for further.

## 2023-10-06 NOTE — ED NOTES
Arpita from [facility] pt is accepted back at facility, will call transfer center to set up transport. Report given to Arpita.

## 2023-10-06 NOTE — CONSULTS
[facility] - Emergency Dept  Neurology  Consult Note    Patient Name: Jerardo Middleton  MRN: [id number]  Admission Date: 10/5/2023  Hospital Length of Stay: 0 days  Code Status: Prior   Attending Provider: No att. providers found   Consulting Provider: Delfino Barrera MD  Primary Care Physician: Daphnie, Primary Doctor  Principal Problem:<principal problem not specified>    Consults   Subjective:     Chief Complaint:  Seizure      HPI:   Ms. Middleton is a 23 y.o. male with significant extensive psychiatric history - seizure disorder - PNEE, Asthma, Bipolar disorder, Substance abuse issues (Amphetamines & cannabis), Fetal alcohol syndrome, and an Impulse control disorder with multiple presentations for self-harmful, odd and suicidal behavior with admission concerns for seizure like activity. And neurology has been consulted for the same.     As per ED note -  Patient comes from [facility] after he punched a wall with his right hand and right elbow. He was admitted tonight for SI and became agitated during intake process. He currently reports pain to the right hand and right elbow. He is currently under a CEC. No medications taken PTA. No alleviating or exacerbating factors noted. Denies numbness, weakness, or other associated symptoms. Course of ED stay:X-rays of right hand and elbow showed no acute abnormalities.  Patient received ibuprofen/Tylenol in the emergency department as well as instructions for contusion.  He was advised to follow-up with his primary care physician within the next 2 weeks for re-evaluation/return to the emergency department if condition worsens. As per report - had multiple events concerning for seizures vs pseudoseizures - with control post multiple PRN ativans / with transient need for intubation / respiratory support. Unclear on the description of the semiology - appears behavioral / thrashing activities with no clear tonic clonic activity / tongue biting / loss of bowel or  bladder continence. Review of prior report - similar presentations / ICU evaluation prior / including EEG capture of events suggestive of non-epileptiform / with unrevealing investigations.      Currently patient is awake / alert / less interactive / however minimal interactions appropriate. No focal motor or sensory or cranial nerve deficits.        Past Medical History:   Diagnosis Date    ADHD (attention deficit hyperactivity disorder)     Asthma     Bipolar disorder     Colitis     Drug abuse     Amphetamines and cannabis    Fetal alcohol syndrome     Foster child 2020    History of psychiatric hospitalization     Hx of psychiatric care     Impulse control disorder     Angela     Psychiatric exam requested by authority     Psychiatric problem     Schizoaffective disorder     Seizures     Sleep difficulties     Therapy        No past surgical history on file.    Review of patient's allergies indicates:   Allergen Reactions    D and c red no.7 Rash    [place] Rash       Current Neurological Medications:     No current facility-administered medications on file prior to encounter.     Current Outpatient Medications on File Prior to Encounter   Medication Sig    hydrOXYzine pamoate (VISTARIL) 50 MG Cap Take 1 capsule (50 mg total) by mouth every 12 (twelve) hours as needed (anxiety).    ibuprofen (ADVIL,MOTRIN) 600 MG tablet Take 1 tablet (600 mg total) by mouth every 8 (eight) hours as needed for Pain.    OXcarbazepine (TRILEPTAL) 300 MG Tab Take 1 tablet (300 mg total) by mouth 2 (two) times daily.    QUEtiapine (SEROQUEL) 300 MG Tab Take 1 tablet (300 mg total) by mouth every evening.    traZODone (DESYREL) 100 MG tablet Take 1 tablet (100 mg total) by mouth nightly as needed for Insomnia.     Family History    Family history is unknown by patient.       Tobacco Use    Smoking status: Every Day     Current packs/day: 0.50     Average packs/day: 0.5 packs/day for 7.0 years (3.5 ttl  "pk-yrs)     Types: Cigarettes    Smokeless tobacco: Never   Substance and Sexual Activity    Alcohol use: No    Drug use: Not Currently    Sexual activity: Yes     Partners: Female     Birth control/protection: None     Review of Systems   Unable to perform ROS: Acuity of condition   Constitutional:  Negative for fever.   Cardiovascular:  Negative for chest pain.   Neurological:  Negative for seizures, facial asymmetry, weakness and numbness.   Psychiatric/Behavioral:  Positive for behavioral problems and decreased concentration.      Objective:     Vital Signs (Most Recent):  Temp: 98.2 °F (36.8 °C) (10/06/23 0300)  Pulse: 79 (10/06/23 0300)  Resp: 16 (10/06/23 0300)  BP: 110/65 (10/06/23 0300)  SpO2: 100 % (10/06/23 0300) Vital Signs (24h Range):  Temp:  [98.2 °F (36.8 °C)-98.7 °F (37.1 °C)] 98.2 °F (36.8 °C)  Pulse:  [] 79  Resp:  [14-90] 16  SpO2:  [93 %-100 %] 100 %  BP: ()/() 110/65     Weight: 79.2 kg (174 lb 11.2 oz)  Body mass index is 22.43 kg/m².     Physical Exam  HENT:      Head: Normocephalic and atraumatic.   Eyes:      Extraocular Movements: Extraocular movements intact and EOM normal.   Pulmonary:      Effort: No respiratory distress.   Psychiatric:         Speech: Speech normal.          NEUROLOGICAL EXAMINATION:     MENTAL STATUS   Oriented to person.   Attention: decreased. Concentration: decreased.   Speech: speech is normal   Level of consciousness: drowsy ,  arousable by tactile stimuli  Normal comprehension.     CRANIAL NERVES     CN III, IV, VI   Extraocular motions are normal.   Ophthalmoparesis: none    CN VII   Facial expression full, symmetric.     MOTOR EXAM        No new focal motor deficits       SENSORY EXAM        No new focal sensory deficits       GAIT AND COORDINATION     Tremor   Resting tremor: absent      Significant Labs: Hemoglobin A1c:   Recent Labs   Lab 09/22/23  0738   HGBA1C 5.1     Blood Culture: No results for input(s): "LABBLOO" in the last 48 " "hours.  BMP:   Recent Labs   Lab 10/05/23  1702   GLU 89      K 4.3      CO2 26   BUN 14   CREATININE 0.8   CALCIUM 9.7   MG 2.0     CBC:   Recent Labs   Lab 10/05/23  1702   WBC 6.95   HGB 14.6   HCT 43.6        CMP:   Recent Labs   Lab 10/05/23  1702   GLU 89      K 4.3      CO2 26   BUN 14   CREATININE 0.8   CALCIUM 9.7   MG 2.0   PROT 8.1   ALBUMIN 4.1   BILITOT 0.2   ALKPHOS 85   AST 19   ALT 12   ANIONGAP 11     CSF Culture: No results for input(s): "CSFCULTURE" in the last 48 hours.  CSF Studies: No results for input(s): "ALIQUT", "APPEARCSF", "COLORCSF", "CSFWBC", "CSFRBC", "GLUCCSF", "LDHCSF", "PROTEINCSF", "VDRLCSF" in the last 48 hours.  Inflammatory Markers: No results for input(s): "SEDRATE", "CRP", "PROCAL" in the last 48 hours.  POCT Glucose:   Recent Labs   Lab 10/05/23  1657   POCTGLUCOSE 83     Prealbumin: No results for input(s): "PREALBUMIN" in the last 48 hours.  Respiratory Culture: No results for input(s): "GSRESP", "RESPIRATORYC" in the last 48 hours.  Urine Culture: No results for input(s): "LABURIN" in the last 48 hours.  Urine Studies:   Recent Labs   Lab 10/05/23  1921   COLORU Yellow   APPEARANCEUA Clear   PHUR 8.0   SPECGRAV 1.025   PROTEINUA Trace*   GLUCUA Negative   KETONESU Trace*   BILIRUBINUA Negative   OCCULTUA Negative   NITRITE Negative   UROBILINOGEN Negative   LEUKOCYTESUR Negative     Recent Lab Results  (Last 5 results in the past 24 hours)        10/05/23  1921   10/05/23  1920   10/05/23  1821   10/05/23  1702   10/05/23  1657        Benzodiazepines   Presumptive Positive             Methadone metabolites   Negative             Phencyclidine   Negative             Albumin       4.1         ALP       85         Allens Test     Pass           ALT       12         Amphetamine Screen, Ur   Negative             Anion Gap       11         Appearance, UA Clear               AST       19         Barbiturate Screen, Ur   Negative             Baso #   "     0.01         Basophil %       0.1         Bilirubin (UA) Negative               BILIRUBIN TOTAL       0.2  Comment: For infants and newborns, interpretation of results should be based  on gestational age, weight and in agreement with clinical  observations.    Premature Infant recommended reference ranges:  Up to 24 hours.............<8.0 mg/dL  Up to 48 hours............<12.0 mg/dL  3-5 days..................<15.0 mg/dL  6-29 days.................<15.0 mg/dL           Site     RR           BUN       14         Calcium       9.7         Chloride       104         CO2       26         Cocaine (Metab.)   Negative             Color, UA Yellow               CPK       270         Creatinine       0.8         Creatinine, Urine   136.9             DelSys     Adult Vent           Differential Method       Automated         eGFR       >60         Eos #       0.1         Eosinophil %       1.3         FiO2     30           Glucose       89         Glucose, UA Negative               Gran # (ANC)       4.9         Gran %       70.9         Hematocrit       43.6         Hemoglobin       14.6         Immature Grans (Abs)       0.02  Comment: Mild elevation in immature granulocytes is non specific and   can be seen in a variety of conditions including stress response,   acute inflammation, trauma and pregnancy. Correlation with other   laboratory and clinical findings is essential.           Immature Granulocytes       0.3         Ketones, UA Trace               Leukocytes, UA Negative               Lymph #       1.3         Lymph %       18.8         Magnesium        2.0         MCH       29.3         MCHC       33.5         MCV       88         Mode     AC/PRVC           Mono #       0.6         Mono %       8.6         MPV       10.1         NITRITE UA Negative               nRBC       0         Occult Blood UA Negative               Opiate Scrn, Ur   Negative             PEEP     05           pH, UA 8.0                Phosphorus Level       3.7         Platelet Count       234         POC BE     0           POC HCO3     26.8           POC PCO2     49.8           POC PH     7.340           POC PO2     164           POC SATURATED O2     99           POC TCO2     28           POCT Glucose         83       Potassium       4.3  Comment: Specimen slightly hemolyzed         PROTEIN TOTAL       8.1         Protein, UA Trace  Comment: Recommend a 24 hour urine protein or a urine   protein/creatinine ratio if globulin induced proteinuria is  clinically suspected.                 Rate     18           RBC       4.98         RDW       12.0         Sample     ARTERIAL           Sodium       141         Specific [place], UA 1.025               Specimen UA Urine, Catheterized               Marijuana (THC) Metabolite   Negative             Toxicology Information   SEE COMMENT  Comment: This screen includes the following classes of drugs at the listed   cut-off:    Benzodiazepines 200 ng/ml  Methadone 300 ng/ml  Cocaine metabolite 300 ng/ml  Opiates 300 ng/ml  Barbiturates 200 ng/ml  Amphetamines 1000 ng/ml  Marijuana metabs (THC) 50 ng/ml  Phencyclidine (PCP) 25 ng/ml    This is a screening test. If results do not correlate with clinical   presentation, then a confirmatory send out test is advised.     This report is intended for use in clinical monitoring and management   of   patients. It is not intended for use in employment related drug   testing.               UROBILINOGEN UA Negative               Valproic Acid Lvl       59.0  Comment: Valproic Acid (ug/ml)  Toxic:   >100.0 ug/ml           Vt     500           WBC       6.95                              All pertinent lab results from the past 24 hours have been reviewed.      Significant Imaging: I have reviewed and interpreted all pertinent imaging results/findings within the past 24 hours.    Assessment and Plan:     Acute encephalopathy  Ms. Middleton is a 23 y.o. male with significant  extensive psychiatric history - seizure disorder - PNEE, Asthma, Bipolar disorder, Substance abuse issues (Amphetamines & cannabis), Fetal alcohol syndrome, and an Impulse control disorder with multiple presentations for self-harmful, odd and suicidal behavior with admission concerns for seizure like activity. And neurology has been consulted for the same.     As per ED note -  Patient comes from [facility] after he punched a wall with his right hand and right elbow. He was admitted tonight for SI and became agitated during intake process. He currently reports pain to the right hand and right elbow. He is currently under a CEC. No medications taken PTA. No alleviating or exacerbating factors noted. Denies numbness, weakness, or other associated symptoms. Course of ED stay:X-rays of right hand and elbow showed no acute abnormalities.  Patient received ibuprofen/Tylenol in the emergency department as well as instructions for contusion.  He was advised to follow-up with his primary care physician within the next 2 weeks for re-evaluation/return to the emergency department if condition worsens. As per report - had multiple events concerning for seizures vs pseudoseizures - with control post multiple PRN ativans / with transient need for intubation / respiratory support. Unclear on the description of the semiology - appears behavioral / thrashing activities with no clear tonic clonic activity / tongue biting / loss of bowel or bladder continence. Review of prior report - similar presentations / ICU evaluation prior / including EEG capture of events suggestive of non-epileptiform / with unrevealing investigations.      Currently patient is awake / alert / less interactive / however minimal interactions appropriate. No focal motor or sensory or cranial nerve deficits.     Recs:  Events suspected in the spectrum of behavioral outbursts / psychogenic non epileptiform activity  No further MRI imaging / EEG  investigations needed at this standpoint. Consider if any clear tonic clonic event witnessed    - Prn ativan 1 mg IV if GTC or focal complex with secondary generalization > 2 mins / repeat ativan every 2 mins to .1mg/kg for uncontrolled seizures  - Continue home medications including trileptal / needs weans off on op basis - as per primary neurologist evalution     -- Correct lytes as needed / control infection if any / avoid hypoxia or hypercapnia / avoid hypoglycemia   -- Correct any nutritional deficiencies / correct anemia / provide nutritional support as appropriate / ambulate when appropriate - PT/OT recs   -- seizure / delirium precautions              VTE Risk Mitigation (From admission, onward)    None          Thank you for your consult.      Delfino Barrera MD  Neurology  [facility] - Emergency Dept

## 2023-10-06 NOTE — ED NOTES
Called [facility] for an update on statues, states still waiting and will call back.

## 2023-10-06 NOTE — ED NOTES
Report called to [facility] of [facility] Resumed care of pt, pt resting in bed, awakens to voice. No needs at this time. RR even and unlabored. Pt has been extubated since 9:30PM, no respiratory complaints. NAD. One to one sitter at bedside. Pt calm and cooperative at this time.

## 2023-10-06 NOTE — DISCHARGE INSTRUCTIONS
Seizure-like activity was observed during her ED stay.  Your care was discussed with a neuro critical Care physician , Dr. Melendez, who reviewed your chart. 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  She did not recommend any further workup for this at this time.  She recommends withholding any benzodiazepine use for future seizure-like activity.    Please continue your treatment at [facility]. Please establish care with a primary care physician once you are released from [facility].     Seizure precautions: Please do not to swim unattended, operate motor vehicles or other machinery, cook over an open flame, or hold small children or infants while standing. Avoid heights or other areas where falls may occur until cleared by primary care physician.     Thank you for coming to our Emergency Department today. It is important to remember that some problems are difficult to diagnose and may not be found during your Emergency Department visit. Be sure to follow up with your primary care doctor and review all labs/imaging/tests that were performed during this visit with them. Some labs/tests may be outside of the normal range and require non-emergent follow-up and further investigation to help diagnose/exclude/prevent complications or other medical conditions.    If you do not have a primary care doctor, you may contact the one listed on your discharge paperwork or you may also call the [facility] Appointment Desk at [phone] to schedule an appointment and establish care with one. It is important to your health that you have a primary care doctor.    Medicaid Escalation Line:   [phone] - Please contact this number if you are having difficulty getting follow up with a Primary Care Provider or Speciality Provider.     Please take all medications as directed. All medications may potentially have side-effects and it is impossible to predict which medications may give  you side-effects or what side-effects (if any) they will give you.. If you feel that you are having a negative effect or side-effect of any medication you should immediately stop taking them and seek medical attention. If you feel that you are having a life-threatening reaction call 911.    Return to the ER with any questions/concerns, new/concerning symptoms, worsening or failure to improve.     Do not drive, swim, climb to height, take a bath or make any important decisions for 24 hours if you have received any pain medications, sedatives or mood altering drugs during your ER visit.        BELOW THIS LINE ONLY APPLIES IF YOU HAVE A COVID TEST PENDING OR IF YOU HAVE BEEN DIAGNOSED WITH COVID:  Please access [facility] to review the results of your test. Until the results of your COVID test return, you should isolate yourself so as not to potentially spread illness to others.   If your COVID test returns positive, you should isolate yourself so as not to spread illness to others. After five full days, if you are feeling better and you have not had fever for 24 hours, you can return to your typical daily activities, but you must wear a mask around others for an additional 5 days.   If your COVID test returns negative and you are either unvaccinated or more than six months out from your two-dose vaccine and are not yet boosted, you should still quarantine for 5 full days followed by strict mask use for an additional 5 full days.   If your COVID test returns negative and you have received your 2-dose initial vaccine as well as a booster, you should continue strict mask use for 10 full days after the exposure.  For all those exposed, best practice includes a test at day 5 after the exposure. This can be a home test or a test through one of the many testing centers throughout our community.   Masking is always advised to limit the spread of COVID. Cdc.gov is an excellent site to obtain the latest up to date  recommendations regarding COVID and COVID testing.     CDC Testing and Quarantine Guidelines for patients with exposure to a known-positive COVID-19 person:  A close exposure is defined as anyone who has had an exposure (masked or unmasked) to a known COVID -19 positive person within 6 feet of someone for a cumulative total of 15 minutes or more over a 24-hour period.   Vaccinated and/or if you recently had a positive covid test within 90 days do NOT need to quarantine after contact with someone who had COVID-19 unless you develop symptoms.   Fully vaccinated people who have not had a positive test within 90 days, should get tested 3-5 days after their exposure, even if they don't have symptoms and wear a mask indoors in public for 14 days following exposure or until their test result is negative.      Unvaccinated and/or NOT had a positive test within 90 days and meet close exposure  You are required by CDC guidelines to quarantine for at least 5 days from time of exposure followed by 5 days of strict masking. It is recommended, but not required to test after 5 days, unless you develop symptoms, in which case you should test at that time.  If you get tested after 5 days and your test is positive, your 5 day period of isolation starts the day of the positive test.    If your exposure does not meet the above definition, you can return to your normal daily activities to include social distancing, wearing a mask and frequent handwashing.      Here is a link to guidance from the CDC:  https://www.cdc.gov/media/releases/2021/s1227-isolation-quarantine-guidance.html      [place] Dept Of Health Testing Sites:  https://ldh.la.gov/page/3934      [facility] website with testing locations and guidance:  [email or web address]

## 2023-10-06 NOTE — SUBJECTIVE & OBJECTIVE
Past Medical History:   Diagnosis Date    ADHD (attention deficit hyperactivity disorder)     Asthma     Bipolar disorder     Colitis     Drug abuse     Amphetamines and cannabis    Fetal alcohol syndrome     Foster child 2020    History of psychiatric hospitalization     Hx of psychiatric care     Impulse control disorder     Angela     Psychiatric exam requested by authority     Psychiatric problem     Schizoaffective disorder     Seizures     Sleep difficulties     Therapy        No past surgical history on file.    Review of patient's allergies indicates:   Allergen Reactions    D and c red no.7 Rash    Dodson Rash       Current Neurological Medications:     No current facility-administered medications on file prior to encounter.     Current Outpatient Medications on File Prior to Encounter   Medication Sig    hydrOXYzine pamoate (VISTARIL) 50 MG Cap Take 1 capsule (50 mg total) by mouth every 12 (twelve) hours as needed (anxiety).    ibuprofen (ADVIL,MOTRIN) 600 MG tablet Take 1 tablet (600 mg total) by mouth every 8 (eight) hours as needed for Pain.    OXcarbazepine (TRILEPTAL) 300 MG Tab Take 1 tablet (300 mg total) by mouth 2 (two) times daily.    QUEtiapine (SEROQUEL) 300 MG Tab Take 1 tablet (300 mg total) by mouth every evening.    traZODone (DESYREL) 100 MG tablet Take 1 tablet (100 mg total) by mouth nightly as needed for Insomnia.     Family History    Family history is unknown by patient.       Tobacco Use    Smoking status: Every Day     Current packs/day: 0.50     Average packs/day: 0.5 packs/day for 7.0 years (3.5 ttl pk-yrs)     Types: Cigarettes    Smokeless tobacco: Never   Substance and Sexual Activity    Alcohol use: No    Drug use: Not Currently    Sexual activity: Yes     Partners: Female     Birth control/protection: None     Review of Systems   Unable to perform ROS: Acuity of condition   Constitutional:  Negative for fever.   Cardiovascular:  Negative for chest pain.   Neurological:   "Negative for seizures, facial asymmetry, weakness and numbness.   Psychiatric/Behavioral:  Positive for behavioral problems and decreased concentration.      Objective:     Vital Signs (Most Recent):  Temp: 98.2 °F (36.8 °C) (10/06/23 0300)  Pulse: 79 (10/06/23 0300)  Resp: 16 (10/06/23 0300)  BP: 110/65 (10/06/23 0300)  SpO2: 100 % (10/06/23 0300) Vital Signs (24h Range):  Temp:  [98.2 °F (36.8 °C)-98.7 °F (37.1 °C)] 98.2 °F (36.8 °C)  Pulse:  [] 79  Resp:  [14-90] 16  SpO2:  [93 %-100 %] 100 %  BP: ()/() 110/65     Weight: 79.2 kg (174 lb 11.2 oz)  Body mass index is 22.43 kg/m².     Physical Exam  HENT:      Head: Normocephalic and atraumatic.   Eyes:      Extraocular Movements: Extraocular movements intact and EOM normal.   Pulmonary:      Effort: No respiratory distress.   Psychiatric:         Speech: Speech normal.          NEUROLOGICAL EXAMINATION:     MENTAL STATUS   Oriented to person.   Attention: decreased. Concentration: decreased.   Speech: speech is normal   Level of consciousness: drowsy ,  arousable by tactile stimuli  Normal comprehension.     CRANIAL NERVES     CN III, IV, VI   Extraocular motions are normal.   Ophthalmoparesis: none    CN VII   Facial expression full, symmetric.     MOTOR EXAM        No new focal motor deficits       SENSORY EXAM        No new focal sensory deficits       GAIT AND COORDINATION     Tremor   Resting tremor: absent      Significant Labs: Hemoglobin A1c:   Recent Labs   Lab 09/22/23  0738   HGBA1C 5.1     Blood Culture: No results for input(s): "LABBLOO" in the last 48 hours.  BMP:   Recent Labs   Lab 10/05/23  1702   GLU 89      K 4.3      CO2 26   BUN 14   CREATININE 0.8   CALCIUM 9.7   MG 2.0     CBC:   Recent Labs   Lab 10/05/23  1702   WBC 6.95   HGB 14.6   HCT 43.6        CMP:   Recent Labs   Lab 10/05/23  1702   GLU 89      K 4.3      CO2 26   BUN 14   CREATININE 0.8   CALCIUM 9.7   MG 2.0   PROT 8.1   ALBUMIN " "4.1   BILITOT 0.2   ALKPHOS 85   AST 19   ALT 12   ANIONGAP 11     CSF Culture: No results for input(s): "CSFCULTURE" in the last 48 hours.  CSF Studies: No results for input(s): "ALIQUT", "APPEARCSF", "COLORCSF", "CSFWBC", "CSFRBC", "GLUCCSF", "LDHCSF", "PROTEINCSF", "VDRLCSF" in the last 48 hours.  Inflammatory Markers: No results for input(s): "SEDRATE", "CRP", "PROCAL" in the last 48 hours.  POCT Glucose:   Recent Labs   Lab 10/05/23  1657   POCTGLUCOSE 83     Prealbumin: No results for input(s): "PREALBUMIN" in the last 48 hours.  Respiratory Culture: No results for input(s): "GSRESP", "RESPIRATORYC" in the last 48 hours.  Urine Culture: No results for input(s): "LABURIN" in the last 48 hours.  Urine Studies:   Recent Labs   Lab 10/05/23  1921   COLORU Yellow   APPEARANCEUA Clear   PHUR 8.0   SPECGRAV 1.025   PROTEINUA Trace*   GLUCUA Negative   KETONESU Trace*   BILIRUBINUA Negative   OCCULTUA Negative   NITRITE Negative   UROBILINOGEN Negative   LEUKOCYTESUR Negative     Recent Lab Results  (Last 5 results in the past 24 hours)        10/05/23  1921   10/05/23  1920   10/05/23  1821   10/05/23  1702   10/05/23  1657        Benzodiazepines   Presumptive Positive             Methadone metabolites   Negative             Phencyclidine   Negative             Albumin       4.1         ALP       85         Allens Test     Pass           ALT       12         Amphetamine Screen, Ur   Negative             Anion Gap       11         Appearance, UA Clear               AST       19         Barbiturate Screen, Ur   Negative             Baso #       0.01         Basophil %       0.1         Bilirubin (UA) Negative               BILIRUBIN TOTAL       0.2  Comment: For infants and newborns, interpretation of results should be based  on gestational age, weight and in agreement with clinical  observations.    Premature Infant recommended reference ranges:  Up to 24 hours.............<8.0 mg/dL  Up to 48 hours............<12.0 " mg/dL  3-5 days..................<15.0 mg/dL  6-29 days.................<15.0 mg/dL           Site     RR           BUN       14         Calcium       9.7         Chloride       104         CO2       26         Cocaine (Metab.)   Negative             Color, UA Yellow               CPK       270         Creatinine       0.8         Creatinine, Urine   136.9             DelSys     Adult Vent           Differential Method       Automated         eGFR       >60         Eos #       0.1         Eosinophil %       1.3         FiO2     30           Glucose       89         Glucose, UA Negative               Gran # (ANC)       4.9         Gran %       70.9         Hematocrit       43.6         Hemoglobin       14.6         Immature Grans (Abs)       0.02  Comment: Mild elevation in immature granulocytes is non specific and   can be seen in a variety of conditions including stress response,   acute inflammation, trauma and pregnancy. Correlation with other   laboratory and clinical findings is essential.           Immature Granulocytes       0.3         Ketones, UA Trace               Leukocytes, UA Negative               Lymph #       1.3         Lymph %       18.8         Magnesium        2.0         MCH       29.3         MCHC       33.5         MCV       88         Mode     AC/PRVC           Mono #       0.6         Mono %       8.6         MPV       10.1         NITRITE UA Negative               nRBC       0         Occult Blood UA Negative               Opiate Scrn, Ur   Negative             PEEP     05           pH, UA 8.0               Phosphorus Level       3.7         Platelet Count       234         POC BE     0           POC HCO3     26.8           POC PCO2     49.8           POC PH     7.340           POC PO2     164           POC SATURATED O2     99           POC TCO2     28           POCT Glucose         83       Potassium       4.3  Comment: Specimen slightly hemolyzed         PROTEIN TOTAL       8.1          Protein, UA Trace  Comment: Recommend a 24 hour urine protein or a urine   protein/creatinine ratio if globulin induced proteinuria is  clinically suspected.                 Rate     18           RBC       4.98         RDW       12.0         Sample     ARTERIAL           Sodium       141         Specific [place], UA 1.025               Specimen UA Urine, Catheterized               Marijuana (THC) Metabolite   Negative             Toxicology Information   SEE COMMENT  Comment: This screen includes the following classes of drugs at the listed   cut-off:    Benzodiazepines 200 ng/ml  Methadone 300 ng/ml  Cocaine metabolite 300 ng/ml  Opiates 300 ng/ml  Barbiturates 200 ng/ml  Amphetamines 1000 ng/ml  Marijuana metabs (THC) 50 ng/ml  Phencyclidine (PCP) 25 ng/ml    This is a screening test. If results do not correlate with clinical   presentation, then a confirmatory send out test is advised.     This report is intended for use in clinical monitoring and management   of   patients. It is not intended for use in employment related drug   testing.               UROBILINOGEN UA Negative               Valproic Acid Lvl       59.0  Comment: Valproic Acid (ug/ml)  Toxic:   >100.0 ug/ml           Vt     500           WBC       6.95                              All pertinent lab results from the past 24 hours have been reviewed.      Significant Imaging: I have reviewed and interpreted all pertinent imaging results/findings within the past 24 hours.

## 2023-10-06 NOTE — HPI
Ms. Middleton is a 23 y.o. male with significant extensive psychiatric history - seizure disorder - PNEE, Asthma, Bipolar disorder, Substance abuse issues (Amphetamines & cannabis), Fetal alcohol syndrome, and an Impulse control disorder with multiple presentations for self-harmful, odd and suicidal behavior with admission concerns for seizure like activity. And neurology has been consulted for the same.     As per ED note -  Patient comes from [facility] after he punched a wall with his right hand and right elbow. He was admitted tonight for SI and became agitated during intake process. He currently reports pain to the right hand and right elbow. He is currently under a CEC. No medications taken PTA. No alleviating or exacerbating factors noted. Denies numbness, weakness, or other associated symptoms. Course of ED stay:X-rays of right hand and elbow showed no acute abnormalities.  Patient received ibuprofen/Tylenol in the emergency department as well as instructions for contusion.  He was advised to follow-up with his primary care physician within the next 2 weeks for re-evaluation/return to the emergency department if condition worsens. As per report - had multiple events concerning for seizures vs pseudoseizures - with control post multiple PRN ativans / with transient need for intubation / respiratory support. Unclear on the description of the semiology - appears behavioral / thrashing activities with no clear tonic clonic activity / tongue biting / loss of bowel or bladder continence. Review of prior report - similar presentations / ICU evaluation prior / including EEG capture of events suggestive of non-epileptiform / with unrevealing investigations.      Currently patient is awake / alert / less interactive / however minimal interactions appropriate. No focal motor or sensory or cranial nerve deficits.

## 2023-10-07 LAB — OXCARBAZEPINE METABOLITE: 12 MCG/ML (ref 10–35)

## 2023-10-08 ENCOUNTER — HOSPITAL ENCOUNTER (EMERGENCY)
Facility: HOSPITAL | Age: 23
Discharge: PSYCHIATRIC OR SUBSTANCE USE FACILITY | End: 2023-10-09
Attending: INTERNAL MEDICINE
Payer: MEDICAID

## 2023-10-08 DIAGNOSIS — R56.9 CONVULSIONS, UNSPECIFIED CONVULSION TYPE: Primary | ICD-10-CM

## 2023-10-08 DIAGNOSIS — Z76.5 MALINGERING: ICD-10-CM

## 2023-10-08 LAB
ALBUMIN SERPL BCP-MCNC: 3.8 G/DL (ref 3.5–5.2)
ALP SERPL-CCNC: 72 U/L (ref 55–135)
ALT SERPL W/O P-5'-P-CCNC: 13 U/L (ref 10–44)
AMPHET+METHAMPHET UR QL: NEGATIVE
ANION GAP SERPL CALC-SCNC: 9 MMOL/L (ref 8–16)
APAP SERPL-MCNC: 3 UG/ML (ref 10–20)
AST SERPL-CCNC: 21 U/L (ref 10–40)
BARBITURATES UR QL SCN>200 NG/ML: NEGATIVE
BASOPHILS # BLD AUTO: 0.02 K/UL (ref 0–0.2)
BASOPHILS NFR BLD: 0.2 % (ref 0–1.9)
BENZODIAZ UR QL SCN>200 NG/ML: NEGATIVE
BILIRUB SERPL-MCNC: 0.2 MG/DL (ref 0.1–1)
BILIRUB UR QL STRIP: NEGATIVE
BUN SERPL-MCNC: 16 MG/DL (ref 6–20)
BZE UR QL SCN: NEGATIVE
CALCIUM SERPL-MCNC: 9.3 MG/DL (ref 8.7–10.5)
CANNABINOIDS UR QL SCN: NEGATIVE
CHLORIDE SERPL-SCNC: 105 MMOL/L (ref 95–110)
CLARITY UR: CLEAR
CO2 SERPL-SCNC: 26 MMOL/L (ref 23–29)
COLOR UR: YELLOW
CREAT SERPL-MCNC: 0.8 MG/DL (ref 0.5–1.4)
CREAT UR-MCNC: 127.2 MG/DL (ref 23–375)
DIFFERENTIAL METHOD: ABNORMAL
EOSINOPHIL # BLD AUTO: 0.1 K/UL (ref 0–0.5)
EOSINOPHIL NFR BLD: 0.9 % (ref 0–8)
ERYTHROCYTE [DISTWIDTH] IN BLOOD BY AUTOMATED COUNT: 11.6 % (ref 11.5–14.5)
EST. GFR  (NO RACE VARIABLE): >60 ML/MIN/1.73 M^2
ETHANOL SERPL-MCNC: <10 MG/DL
GLUCOSE SERPL-MCNC: 116 MG/DL (ref 70–110)
GLUCOSE UR QL STRIP: NEGATIVE
HCT VFR BLD AUTO: 38.5 % (ref 40–54)
HGB BLD-MCNC: 13.1 G/DL (ref 14–18)
HGB UR QL STRIP: NEGATIVE
IMM GRANULOCYTES # BLD AUTO: 0.05 K/UL (ref 0–0.04)
IMM GRANULOCYTES NFR BLD AUTO: 0.5 % (ref 0–0.5)
KETONES UR QL STRIP: ABNORMAL
LEUKOCYTE ESTERASE UR QL STRIP: NEGATIVE
LYMPHOCYTES # BLD AUTO: 1.7 K/UL (ref 1–4.8)
LYMPHOCYTES NFR BLD: 18.3 % (ref 18–48)
MCH RBC QN AUTO: 29.7 PG (ref 27–31)
MCHC RBC AUTO-ENTMCNC: 34 G/DL (ref 32–36)
MCV RBC AUTO: 87 FL (ref 82–98)
METHADONE UR QL SCN>300 NG/ML: NEGATIVE
MONOCYTES # BLD AUTO: 0.6 K/UL (ref 0.3–1)
MONOCYTES NFR BLD: 6.6 % (ref 4–15)
NEUTROPHILS # BLD AUTO: 7 K/UL (ref 1.8–7.7)
NEUTROPHILS NFR BLD: 73.5 % (ref 38–73)
NITRITE UR QL STRIP: NEGATIVE
NRBC BLD-RTO: 0 /100 WBC
OPIATES UR QL SCN: NEGATIVE
PCP UR QL SCN>25 NG/ML: NEGATIVE
PH UR STRIP: 7 [PH] (ref 5–8)
PLATELET # BLD AUTO: 242 K/UL (ref 150–450)
PMV BLD AUTO: 10.1 FL (ref 9.2–12.9)
POTASSIUM SERPL-SCNC: 3.8 MMOL/L (ref 3.5–5.1)
PROT SERPL-MCNC: 7.3 G/DL (ref 6–8.4)
PROT UR QL STRIP: NEGATIVE
RBC # BLD AUTO: 4.41 M/UL (ref 4.6–6.2)
SODIUM SERPL-SCNC: 140 MMOL/L (ref 136–145)
SP GR UR STRIP: 1.03 (ref 1–1.03)
TOXICOLOGY INFORMATION: NORMAL
URN SPEC COLLECT METH UR: ABNORMAL
UROBILINOGEN UR STRIP-ACNC: NEGATIVE EU/DL
WBC # BLD AUTO: 9.52 K/UL (ref 3.9–12.7)

## 2023-10-08 PROCEDURE — 81003 URINALYSIS AUTO W/O SCOPE: CPT | Mod: 59 | Performed by: INTERNAL MEDICINE

## 2023-10-08 PROCEDURE — 80053 COMPREHEN METABOLIC PANEL: CPT | Performed by: INTERNAL MEDICINE

## 2023-10-08 PROCEDURE — 85025 COMPLETE CBC W/AUTO DIFF WBC: CPT | Performed by: INTERNAL MEDICINE

## 2023-10-08 PROCEDURE — 99285 EMERGENCY DEPT VISIT HI MDM: CPT

## 2023-10-08 PROCEDURE — 80307 DRUG TEST PRSMV CHEM ANLYZR: CPT | Performed by: INTERNAL MEDICINE

## 2023-10-08 PROCEDURE — 80143 DRUG ASSAY ACETAMINOPHEN: CPT | Performed by: INTERNAL MEDICINE

## 2023-10-08 PROCEDURE — 82077 ASSAY SPEC XCP UR&BREATH IA: CPT | Performed by: INTERNAL MEDICINE

## 2023-10-08 PROCEDURE — 84443 ASSAY THYROID STIM HORMONE: CPT | Performed by: INTERNAL MEDICINE

## 2023-10-09 VITALS
HEIGHT: 74 IN | RESPIRATION RATE: 14 BRPM | BODY MASS INDEX: 22.46 KG/M2 | TEMPERATURE: 98 F | SYSTOLIC BLOOD PRESSURE: 124 MMHG | OXYGEN SATURATION: 98 % | HEART RATE: 84 BPM | DIASTOLIC BLOOD PRESSURE: 60 MMHG | WEIGHT: 175 LBS

## 2023-10-09 LAB
CTP QC/QA: YES
SARS-COV-2 RDRP RESP QL NAA+PROBE: NEGATIVE
TSH SERPL DL<=0.005 MIU/L-ACNC: 0.62 UIU/ML (ref 0.4–4)

## 2023-10-09 PROCEDURE — 87635 SARS-COV-2 COVID-19 AMP PRB: CPT | Performed by: INTERNAL MEDICINE

## 2023-10-09 NOTE — ED NOTES
"Pt is alert and talking in clear complete sentences. Pt ripped off all of cardiac monitor leads and states, "I'm not putting that back on. Im refusing all care. Im not taking any medicines". MD aware.   "

## 2023-10-09 NOTE — ED NOTES
Spoke with [facility], reports that they are not accepting the pt.  Called and informed Milagros from transfer center that pt has been denied.

## 2023-10-09 NOTE — ED NOTES
Pt appears to be convulsing,  lasting approximately 60 seconds, non rebreather placed onto pt, MD at bedside.

## 2023-10-09 NOTE — ED TRIAGE NOTES
Pt presents to ED via EMS from [facility] after having seizure-like activity for about 40 minutes, per staff. He was given 4 mg ativan by staff prior to EMS arrival. Patient is alert in triage.Pt reports that he was sitting in his wheelchair prior to seizure, pt reports that he is compliant with all his meds. Pt is alert, calm, and oriented x4, placed on cardiac monitoring and continuous pulse ox, 96% on RA. Suction in place and on standby.

## 2023-10-09 NOTE — ED PROVIDER NOTES
Encounter Date: 10/8/2023    SCRIBE #1 NOTE: I, Vicky Lopes, am scribing for, and in the presence of,  Torey Cleveland MD. I have scribed the following portions of the note - Other sections scribed: HPI, ROS, PE.       History     Chief Complaint   Patient presents with    Seizures     Patient arrives via EMS from [facility] after having seizure-like activity for about 40 minutes, per staff. He was given 4 mg ativan by staff prior to EMS arrival. Patient is alert in triage.     22 yo M with a PMHx of schizophrenia, epilepsy, depression, prior suicide attempt, fetal alcohol syndrome, polysubstance abuse, spina bifida, intellectual disability, presenting to the ED via [facility] following a seizure. Patient currently denies suicidal or homicidal ideation at this time. No other exacerbating or alleviating factors. Denies nausea, vomiting, headache, CP, SOB, pain anywhere, dizziness or other associated symptoms.     The history is provided by the patient. No  was used.     Review of patient's allergies indicates:   Allergen Reactions    D and c red no.7 Rash    [place] Rash     Past Medical History:   Diagnosis Date    ADHD (attention deficit hyperactivity disorder)     Asthma     Bipolar disorder     Colitis     Drug abuse     Amphetamines and cannabis    Fetal alcohol syndrome     Foster child 2020    History of psychiatric hospitalization     Hx of psychiatric care     Impulse control disorder     Angela     Psychiatric exam requested by authority     Psychiatric problem     Schizoaffective disorder     Seizures     Sleep difficulties     Therapy      No past surgical history on file.  Family History   Family history unknown: Yes     Social History     Tobacco Use    Smoking status: Every Day     Current packs/day: 0.50     Average packs/day: 0.5 packs/day for 7.0 years (3.5 ttl pk-yrs)     Types: Cigarettes    Smokeless tobacco: Never   Substance Use Topics    Alcohol use: No     Drug use: Not Currently     Review of Systems   Constitutional:  Negative for fever.   HENT:  Negative for sore throat.    Respiratory:  Negative for shortness of breath.    Cardiovascular:  Negative for chest pain.   Gastrointestinal:  Negative for diarrhea, nausea and vomiting.   Genitourinary:  Negative for dysuria.   Musculoskeletal:  Negative for back pain and myalgias.   Skin:  Negative for rash.   Neurological:  Positive for seizures. Negative for dizziness, weakness and headaches.   Psychiatric/Behavioral:  Negative for behavioral problems.         (-) suicidal ideation  (-) homicidal ideation   All other systems reviewed and are negative.      Physical Exam     Initial Vitals   BP Pulse Resp Temp SpO2   10/08/23 2029 10/08/23 2029 10/08/23 2029 10/08/23 2031 10/08/23 2029   (!) 110/56 103 18 98.4 °F (36.9 °C) 98 %      MAP       --                Physical Exam    Nursing note and vitals reviewed.  Constitutional: He appears well-developed and well-nourished. He is not diaphoretic. No distress.   HENT:   Head: Normocephalic and atraumatic.   Right Ear: External ear normal.   Left Ear: External ear normal.   Nose: Nose normal.   Mouth/Throat: Oropharynx is clear and moist.   Eyes: Conjunctivae and EOM are normal. Pupils are equal, round, and reactive to light.   Neck: Neck supple.   Normal range of motion.  Cardiovascular:  Normal rate, regular rhythm, normal heart sounds and intact distal pulses.     Exam reveals no gallop and no friction rub.       No murmur heard.  Pulmonary/Chest: Breath sounds normal. No respiratory distress. He has no wheezes. He has no rhonchi. He has no rales.   Abdominal: Abdomen is soft. Bowel sounds are normal. There is no abdominal tenderness.   Musculoskeletal:         General: No edema. Normal range of motion.      Cervical back: Normal range of motion and neck supple.     Neurological: He is alert and oriented to person, place, and time. He has normal strength. GCS score is  15. GCS eye subscore is 4. GCS verbal subscore is 5. GCS motor subscore is 6.   Skin: Skin is warm and dry. Capillary refill takes less than 2 seconds.   Psychiatric: He has a normal mood and affect. Thought content normal.         ED Course   Procedures  Labs Reviewed   CBC W/ AUTO DIFFERENTIAL - Abnormal; Notable for the following components:       Result Value    RBC 4.41 (*)     Hemoglobin 13.1 (*)     Hematocrit 38.5 (*)     Immature Grans (Abs) 0.05 (*)     Gran % 73.5 (*)     All other components within normal limits   COMPREHENSIVE METABOLIC PANEL - Abnormal; Notable for the following components:    Glucose 116 (*)     All other components within normal limits          Imaging Results    None          Medications - No data to display  Medical Decision Making  22 yo M with a PMHx of schizophrenia, epilepsy, depression, prior suicide attempt, fetal alcohol syndrome, polysubstance abuse, spina bifida, intellectual disability, presenting to the ED via [facility] following a seizure. Patient currently denies suicidal or homicidal ideation at this time. No other exacerbating or alleviating factors. Denies nausea, vomiting, headache, CP, SOB, pain anywhere, dizziness or other associated symptoms.   Course of ED stay:   Patient had an episode of convulsions during ED stay.  At the bedside, patient was responding to verbal commands during convulsive episode.  Episode lasted approximately 20 seconds and patient had no evidence of being postictal after episode.  Upon review of old records, patient has a history of nonepileptic compulsive activity and he was made aware that he would not receive Ativan during this hospital stay.  Patient then became angry and stated he wanted to go back to [facility] inpatient facility.      Amount and/or Complexity of Data Reviewed  Labs: ordered.            Scribe Attestation:   Scribe #1: I performed the above scribed service and the documentation accurately describes the services  I performed. I attest to the accuracy of the note.                      This document was produced by a scribe under my direction and in my presence. I agree with the content of the note and have made any necessary edits.     Dr. Cleveland    10/08/2023 10:33 PM    Clinical Impression:   Final diagnoses:  [R56.9] Convulsions, unspecified convulsion type (Primary)  [Z76.5] Malingering        ED Disposition Condition    Discharge Stable          ED Prescriptions    None       Follow-up Information    None          Torey Cleveland MD  10/08/23 1628

## 2023-10-09 NOTE — ED NOTES
Patient now angry and cursing, asking for more food, unhappy about perceived lack of treatment for his condition

## 2023-10-26 PROBLEM — R45.851 SUICIDAL IDEATION: Status: RESOLVED | Noted: 2022-01-03 | Resolved: 2023-10-26

## 2023-10-26 PROBLEM — F31.12 BIPOLAR 1 DISORDER WITH MODERATE MANIA: Chronic | Status: ACTIVE | Noted: 2023-10-26

## 2023-10-26 PROBLEM — G40.901 STATUS EPILEPTICUS: Status: RESOLVED | Noted: 2022-01-03 | Resolved: 2023-10-26

## 2023-10-26 PROBLEM — R56.9 CONVULSIONS: Status: RESOLVED | Noted: 2021-11-21 | Resolved: 2023-10-26

## 2023-10-26 PROBLEM — Z76.5 MALINGERING: Status: RESOLVED | Noted: 2023-10-08 | Resolved: 2023-10-26

## 2023-10-26 PROBLEM — S06.9XAA TBI (TRAUMATIC BRAIN INJURY): Chronic | Status: ACTIVE | Noted: 2023-10-26

## 2023-10-26 PROBLEM — R41.9 NEUROCOGNITIVE DISORDER: Chronic | Status: ACTIVE | Noted: 2023-10-26

## 2023-10-26 PROBLEM — F43.10 PTSD (POST-TRAUMATIC STRESS DISORDER): Chronic | Status: ACTIVE | Noted: 2023-10-26

## 2023-10-26 PROBLEM — F63.9 IMPULSE CONTROL DISEASE: Status: ACTIVE | Noted: 2023-10-26

## 2023-11-02 PROBLEM — F31.12: Status: ACTIVE | Noted: 2023-10-26

## 2023-11-02 PROBLEM — F31.4: Status: ACTIVE | Noted: 2023-10-26

## 2023-11-19 PROBLEM — F31.9 BIPOLAR DISORDER: Status: ACTIVE | Noted: 2023-11-19

## 2023-12-23 ENCOUNTER — HOSPITAL ENCOUNTER (EMERGENCY)
Facility: HOSPITAL | Age: 23
Discharge: OTHER INSTITUTION | End: 2023-12-23
Attending: EMERGENCY MEDICINE
Payer: MEDICAID

## 2023-12-23 VITALS
DIASTOLIC BLOOD PRESSURE: 60 MMHG | RESPIRATION RATE: 18 BRPM | WEIGHT: 148.13 LBS | TEMPERATURE: 98 F | OXYGEN SATURATION: 100 % | SYSTOLIC BLOOD PRESSURE: 106 MMHG | BODY MASS INDEX: 19.02 KG/M2 | HEART RATE: 61 BPM

## 2023-12-23 DIAGNOSIS — M25.529 ELBOW PAIN: Primary | ICD-10-CM

## 2023-12-23 PROCEDURE — 25000003 PHARM REV CODE 250: Performed by: FAMILY MEDICINE

## 2023-12-23 PROCEDURE — 63600175 PHARM REV CODE 636 W HCPCS: Performed by: EMERGENCY MEDICINE

## 2023-12-23 PROCEDURE — 96374 THER/PROPH/DIAG INJ IV PUSH: CPT

## 2023-12-23 PROCEDURE — 99285 EMERGENCY DEPT VISIT HI MDM: CPT | Mod: 25

## 2023-12-23 RX ORDER — IBUPROFEN 600 MG/1
600 TABLET ORAL
Status: COMPLETED | OUTPATIENT
Start: 2023-12-23 | End: 2023-12-23

## 2023-12-23 RX ORDER — MORPHINE SULFATE 4 MG/ML
4 INJECTION, SOLUTION INTRAMUSCULAR; INTRAVENOUS
Status: COMPLETED | OUTPATIENT
Start: 2023-12-23 | End: 2023-12-23

## 2023-12-23 RX ADMIN — MORPHINE SULFATE 4 MG: 4 INJECTION INTRAVENOUS at 03:12

## 2023-12-23 RX ADMIN — IBUPROFEN 600 MG: 600 TABLET, FILM COATED ORAL at 09:12

## 2023-12-23 NOTE — ED PROVIDER NOTES
SCRIBE #1 NOTE: I, Juan Manuel Duggan, am scribing for, and in the presence of, Eh Ashley MD. I have scribed the HPI, ROS, and PEx.     SCRIBE #2 NOTE: I, Sheree Jackson, am scribing for, and in the presence of,  Vonnie Borden MD. I have scribed the remaining portions of the note not scribed by Scribe #1.      History     Chief Complaint   Patient presents with    Right elbow pain     Pt is a PEC patient from [facility]. EMS reports unwitnessed seizure with a fall, causing pt to injured his right elbow.Pain rating 10/10      Review of patient's allergies indicates:   Allergen Reactions    D and c red no.7 Rash    [place] Rash         History of Present Illness     HPI    12/23/2023, 3:24 PM  History obtained from the patient and EMS      History of Present Illness: Jerardo Middleton is a 23 y.o. male patient with a PMHx of asthma, colitis, fetal alcohol syndrome, bipolar disorder, impulse control disorder, drug abuse, schizoaffective disorder, ADHD, seizures, psychiatric care, and sleep difficulties who presents to the Emergency Department for evaluation of R elbow pain which onset from a fall. Pt is a PEC pt from [facility]. Per EMS, pt had an unwitnessed seizure with a fall, causing the injury. Symptoms are constant, and pt rates the pain a 10/10. No mitigating or exacerbating factors reported. No associated sxs reported. Patient denies any and all other sxs at this time. No further complaints or concerns at this time.       Arrival mode: Ambulance Service    PCP: No, Primary Doctor        Past Medical History:  Past Medical History:   Diagnosis Date    ADHD (attention deficit hyperactivity disorder)     Asthma     Bipolar 1 disorder with moderate char 10/26/2023    Bipolar disorder     Colitis     Drug abuse     Amphetamines and cannabis    Fetal alcohol syndrome     Foster child 2020    History of psychiatric hospitalization     Hx of psychiatric care     Impulse control disorder      Angela     Psychiatric exam requested by authority     Psychiatric problem     Schizoaffective disorder     Seizures     Sleep difficulties     Therapy        Past Surgical History:  No past surgical history on file.      Family History:  Family History   Family history unknown: Yes       Social History:  Social History     Tobacco Use    Smoking status: Every Day     Current packs/day: 0.50     Average packs/day: 0.5 packs/day for 7.0 years (3.5 ttl pk-yrs)     Types: Cigarettes    Smokeless tobacco: Never   Substance and Sexual Activity    Alcohol use: No    Drug use: Not Currently    Sexual activity: Yes     Partners: Female     Birth control/protection: None        Review of Systems     Review of Systems   Constitutional:  Negative for fever.   HENT:  Negative for sore throat.    Respiratory:  Negative for shortness of breath.    Cardiovascular:  Negative for chest pain.   Gastrointestinal:  Negative for nausea.   Genitourinary:  Negative for dysuria.   Musculoskeletal:  Positive for arthralgias (R elbow). Negative for back pain.   Skin:  Negative for rash.   Neurological:  Negative for weakness.   Hematological:  Does not bruise/bleed easily.      Physical Exam     Initial Vitals   BP Pulse Resp Temp SpO2   12/23/23 1507 12/23/23 1507 12/23/23 1508 12/23/23 1435 12/23/23 1507   124/65 69 15 98 °F (36.7 °C) 99 %      MAP       --                 Physical Exam  Nursing Notes and Vital Signs Reviewed.  Constitutional: Patient is in no acute distress. Well-developed and well-nourished.  Head: Atraumatic. Normocephalic.  Eyes: PERRL. EOM intact. Conjunctivae are not pale. No scleral icterus.  ENT: Mucous membranes are moist. Oropharynx is clear and symmetric.    Neck: Supple. Full ROM. No lymphadenopathy.  Cardiovascular: Regular rate. Regular rhythm. No murmurs, rubs, or gallops. Distal pulses are 2+ and symmetric.  Pulmonary/Chest: No respiratory distress. Clear to auscultation bilaterally. No wheezing or  rales.  Abdominal: Soft and non-distended.  There is no tenderness.  No rebound, guarding, or rigidity. Good bowel sounds.  Genitourinary: No CVA tenderness  Musculoskeletal: Moves all extremities. No obvious deformities. No edema. No calf tenderness. R elbow tenderness.   Skin: Warm and dry.  Neurological:  Alert, awake, and appropriate.  Normal speech.  No acute focal neurological deficits are appreciated.  Psychiatric: Normal affect. Good eye contact. Appropriate in content.     ED Course   Procedures  ED Vital Signs:  Vitals:    12/23/23 1435 12/23/23 1507 12/23/23 1508 12/23/23 1509   BP:  124/65     Pulse:  69     Resp:   15    Temp: 98 °F (36.7 °C)      SpO2:  99%     Weight:    67.2 kg (148 lb 2.4 oz)    12/23/23 1532 12/23/23 1539 12/23/23 1632 12/23/23 1719   BP: 106/77  (!) 106/56 (!) 122/58   Pulse: 68  (!) 59    Resp: 18 16 16    Temp:       SpO2: 100%  97%    Weight:        12/23/23 1720 12/23/23 1900   BP:  106/60   Pulse: 68 61   Resp: 17 18   Temp:     SpO2: 100% 100%   Weight:         Abnormal Lab Results:  Labs Reviewed - No data to display     All Lab Results:  Results for orders placed or performed during the hospital encounter of 11/18/23   CBC Auto Differential   Result Value Ref Range    WBC 7.07 3.90 - 12.70 K/uL    RBC 4.26 (L) 4.60 - 6.20 M/uL    Hemoglobin 12.6 12.5 - 16.3 g/dL    Hematocrit 38.0 36.7 - 47.1 %    MCV 89 82 - 98 fL    MCH 29.6 27.0 - 31.0 pg    MCHC 33.2 29.6 - 33.8 g/dL    RDW 12.9 11.5 - 14.5 %    Platelets 294 150 - 450 K/uL    MPV 10.2 9.2 - 12.9 fL    Immature Granulocytes 0.3 0.0 - 0.5 %    Gran # (ANC) 4.1 1.8 - 7.7 K/uL    Immature Grans (Abs) 0.02 0.00 - 0.04 K/uL    Lymph # 2.0 1.0 - 4.8 K/uL    Mono # 0.8 0.3 - 1.0 K/uL    Eos # 0.1 0.0 - 0.5 K/uL    Baso # 0.02 0.00 - 0.20 K/uL    nRBC 0 0 /100 WBC    Gran % 58.5 38.0 - 73.0 %    Lymph % 28.3 18.0 - 48.0 %    Mono % 11.0 4.0 - 15.0 %    Eosinophil % 1.6 0.0 - 8.0 %    Basophil % 0.3 0.0 - 1.9 %    Differential  Method Automated    Comprehensive metabolic panel   Result Value Ref Range    Sodium 141 136 - 145 mmol/L    Potassium 3.6 3.5 - 5.1 mmol/L    Chloride 108 95 - 110 mmol/L    CO2 26 23 - 29 mmol/L    Glucose 83 70 - 110 mg/dL    BUN 10 6 - 20 mg/dL    Creatinine 0.7 0.5 - 1.4 mg/dL    Calcium 8.6 (L) 8.7 - 10.5 mg/dL    Total Protein 6.7 6.0 - 8.4 g/dL    Albumin 3.8 3.5 - 5.2 g/dL    Total Bilirubin 0.3 0.1 - 1.0 mg/dL    Alkaline Phosphatase 73 55 - 135 U/L    AST 21 10 - 40 U/L    ALT 12 10 - 44 U/L    eGFR >60.0 >60 mL/min/1.73 m^2    Anion Gap 7 (L) 8 - 16 mmol/L   Lipid Panel   Result Value Ref Range    Cholesterol 120 120 - 199 mg/dL    Triglycerides 59 30 - 150 mg/dL    HDL 39 (L) 40 - 75 mg/dL    LDL Cholesterol 72.0 63.0 - 159.0 mg/dL    HDL/Cholesterol Ratio 32.5 20.0 - 50.0 %    Total Cholesterol/HDL Ratio 3.1 2.0 - 5.0    Non-HDL Cholesterol 3 mg/dL   CK   Result Value Ref Range     (H) 20 - 200 U/L   Ethanol   Result Value Ref Range    Alcohol, Serum <10 <10 mg/dL   Urinalysis, Reflex to Urine Culture Urine, Clean Catch    Specimen: Urine   Result Value Ref Range    Specimen UA Urine, Clean Catch     Color, UA Yellow Yellow, Straw, Wendy    Appearance, UA Clear Clear    pH, UA 7.0 5.0 - 8.0    Specific Gravity, UA 1.025 1.005 - 1.030    Protein, UA Negative Negative    Glucose, UA Negative Negative    Ketones, UA Negative Negative    Bilirubin (UA) Negative Negative    Occult Blood UA Negative Negative    Nitrite, UA Negative Negative    Urobilinogen, UA 1.0 <2.0 EU/dL    Leukocytes, UA Negative Negative   Drug screen panel, emergency   Result Value Ref Range    Benzodiazepines Negative Negative    Methadone metabolites Negative Negative    Cocaine (Metab.) Negative Negative    Opiate Scrn, Ur Negative Negative    Barbiturate Screen, Ur Negative Negative    Amphetamine Screen, Ur Negative Negative    THC Negative Negative    Phencyclidine Negative Negative    Creatinine, Urine 234.0 23.0 - 375.0  mg/dL    Toxicology Information SEE COMMENT    Buprenorphine, Urine   Result Value Ref Range    BUPRENORPHINE Negative Negative         Imaging Results:  Imaging Results              CT Head Without Contrast (Final result)  Result time 12/23/23 17:19:21      Final result by Juan Miguel Gregory MD (12/23/23 17:19:21)                   Impression:      No acute abnormality.    All CT scans   are performed using dose optimization techniques including the following: automated exposure control; adjustment of the mA and/or kV; use of iterative reconstruction technique.  Dose modulation was employed for ALARA by means of: Automated exposure control; adjustment of the mA and/or kV according to patient size (this includes techniques or standardized protocols for targeted exams where dose is matched to indication/reason for exam; i.e. extremities or head); and/or use of iterative reconstructive technique.      Electronically signed by: Juan Miguel Gregory  Date:    12/23/2023  Time:    17:19               Narrative:    EXAMINATION:  CT HEAD WITHOUT CONTRAST    CLINICAL HISTORY:  Facial trauma, blunt;    TECHNIQUE:  Low dose axial CT images obtained throughout the head without intravenous contrast. Sagittal and coronal reconstructions were performed.    COMPARISON:  None.    FINDINGS:  Intracranial compartment:    Ventricles and sulci are normal in size for age without evidence of hydrocephalus. No extra-axial blood or fluid collections.    No parenchymal mass, hemorrhage, edema or major vascular distribution infarct.    Skull/extracranial contents (limited evaluation): No fracture. Mastoid air cells and paranasal sinuses are essentially clear.                                       X-Ray Elbow Complete Right (Final result)  Result time 12/23/23 16:29:32      Final result by Juan Miguel Gregory MD (12/23/23 16:29:32)                   Impression:      As above      Electronically signed by: Juan Miguel Gregory  Date:    12/23/2023  Time:    16:29                Narrative:    EXAMINATION:  XR ELBOW COMPLETE 3 VIEW RIGHT    CLINICAL HISTORY:  XR ELBOW COMPLETE 3 VIEW RIGHTPain in unspecified elbow    COMPARISON:  None    FINDINGS:  Multiple radiographic views  were obtained.    No evidence of acute fracture or dislocation.  Soft tissue swelling.  Limited positioning.  Slight anterior fat pad is identified.  Correlate clinically and recommend follow-up.                                             The Emergency Provider reviewed the vital signs and test results, which are outlined above.     ED Discussion     4:00 PM: Dr. Ashley transfers care of patient to Dr. Borden pending radiology results.     4:34 PM: Reassessed pt at this time. Discussed with pt all pertinent ED information and results. Discussed pt dx and plan of tx. Gave pt all f/u and return to the ED instructions. All questions and concerns were addressed at this time. Pt expresses understanding of information and instructions, and is comfortable with plan to discharge. Pt is stable for discharge.    I discussed with patient and/or family/caretaker that evaluation in the ED does not suggest any emergent or life threatening medical conditions requiring immediate intervention beyond what was provided in the ED, and I believe patient is safe for discharge.  Regardless, an unremarkable evaluation in the ED does not preclude the development or presence of a serious of life threatening condition. As such, patient was instructed to return immediately for any worsening or change in current symptoms.         Medical Decision Making  Amount and/or Complexity of Data Reviewed  Radiology: ordered. Decision-making details documented in ED Course.    Risk  Prescription drug management.                ED Medication(s):  Medications   morphine injection 4 mg (4 mg Intravenous Given 12/23/23 1539)   ibuprofen tablet 600 mg (600 mg Oral Given 12/23/23 2134)       Discharge Medication List as of 12/23/2023  4:33 PM                   Scribe Attestation:   Scribe #1: I performed the above scribed service and the documentation accurately describes the services I performed. I attest to the accuracy of the note.     Attending:   Physician Attestation Statement for Scribe #1: I, Eh Ashley MD, personally performed the services described in this documentation, as scribed by Juan Manuel Duggan, in my presence, and it is both accurate and complete.       Scribe Attestation:   Scribe #2: I performed the above scribed service and the documentation accurately describes the services I performed. I attest to the accuracy of the note.    Attending Attestation:           Physician Attestation for Scribe:    Physician Attestation Statement for Scribe #2: I, Vonnie Borden MD, reviewed documentation, as scribed by Sheree Jackson in my presence, and it is both accurate and complete. I also acknowledge and confirm the content of the note done by Scribe #1.           Clinical Impression       ICD-10-CM ICD-9-CM   1. Elbow pain  M25.529 719.42       Disposition:   Disposition: Discharged  Condition: Stable        Vonnie Borden MD  12/23/23 1884

## 2023-12-23 NOTE — ED NOTES
Bed: 11  Expected date:   Expected time:   Means of arrival: Ambulance Service  Comments:  [facility] - when clean

## 2023-12-24 ENCOUNTER — HOSPITAL ENCOUNTER (EMERGENCY)
Facility: HOSPITAL | Age: 23
Discharge: PSYCHIATRIC OR SUBSTANCE USE FACILITY | End: 2023-12-25
Attending: EMERGENCY MEDICINE
Payer: MEDICAID

## 2023-12-24 VITALS
DIASTOLIC BLOOD PRESSURE: 69 MMHG | TEMPERATURE: 97 F | BODY MASS INDEX: 19.02 KG/M2 | HEIGHT: 74 IN | OXYGEN SATURATION: 99 % | SYSTOLIC BLOOD PRESSURE: 111 MMHG | HEART RATE: 66 BPM | RESPIRATION RATE: 20 BRPM

## 2023-12-24 DIAGNOSIS — G40.919 BREAKTHROUGH SEIZURE: Primary | ICD-10-CM

## 2023-12-24 PROCEDURE — 99284 EMERGENCY DEPT VISIT MOD MDM: CPT | Mod: 25

## 2023-12-24 PROCEDURE — 63600175 PHARM REV CODE 636 W HCPCS: Performed by: EMERGENCY MEDICINE

## 2023-12-24 PROCEDURE — 96366 THER/PROPH/DIAG IV INF ADDON: CPT

## 2023-12-24 PROCEDURE — 25000003 PHARM REV CODE 250: Performed by: EMERGENCY MEDICINE

## 2023-12-24 PROCEDURE — 96365 THER/PROPH/DIAG IV INF INIT: CPT

## 2023-12-24 RX ORDER — FLUOXETINE HYDROCHLORIDE 20 MG/1
20 CAPSULE ORAL DAILY
COMMUNITY

## 2023-12-24 RX ORDER — QUETIAPINE FUMARATE 300 MG/1
300 TABLET, FILM COATED ORAL DAILY
COMMUNITY

## 2023-12-24 RX ORDER — OXCARBAZEPINE 300 MG/1
300 TABLET, FILM COATED ORAL 2 TIMES DAILY
COMMUNITY
Start: 2023-12-20

## 2023-12-24 RX ORDER — CLONIDINE HYDROCHLORIDE 0.1 MG/1
0.1 TABLET ORAL EVERY 4 HOURS PRN
COMMUNITY
Start: 2023-11-16

## 2023-12-24 RX ORDER — LEVETIRACETAM 10 MG/ML
1000 INJECTION INTRAVASCULAR
Status: COMPLETED | OUTPATIENT
Start: 2023-12-24 | End: 2023-12-25

## 2023-12-24 RX ORDER — LORAZEPAM 1 MG/1
2 TABLET ORAL
Status: COMPLETED | OUTPATIENT
Start: 2023-12-24 | End: 2023-12-24

## 2023-12-24 RX ORDER — LEVETIRACETAM 500 MG/1
1500 TABLET, EXTENDED RELEASE ORAL 2 TIMES DAILY
COMMUNITY

## 2023-12-24 RX ORDER — LACOSAMIDE 50 MG/1
50 TABLET ORAL EVERY 12 HOURS
COMMUNITY
Start: 2023-12-20

## 2023-12-24 RX ORDER — LEVETIRACETAM 1000 MG/1
1000 TABLET ORAL 2 TIMES DAILY
Qty: 60 TABLET | Refills: 0 | Status: SHIPPED | OUTPATIENT
Start: 2023-12-24 | End: 2024-12-23

## 2023-12-24 RX ADMIN — LEVETIRACETAM INJECTION 1000 MG: 10 INJECTION INTRAVENOUS at 07:12

## 2023-12-24 RX ADMIN — LORAZEPAM 2 MG: 1 TABLET ORAL at 10:12

## 2023-12-25 NOTE — ED PROVIDER NOTES
"SCRIBE #1 NOTE: I, Me-Chantelle Jackson, am scribing for, and in the presence of, Stanley Etienne Jr., MD. I have scribed the entire note.       History     Chief Complaint   Patient presents with    Seizures     Sent from [facility], seen here yesterday for same issue. Nurse reports that has a Sz 15 min ago and "he needs his meds straightened out"      Review of patient's allergies indicates:   Allergen Reactions    D and c red no.7 Rash    [place] Rash         History of Present Illness     HPI    12/24/2023, 7:21 PM  History obtained from the patient      History of Present Illness: Jerardo Middleton is a 23 y.o. male patient with a PMHx of ADHD, asthma, bipolar 1 disorder with moderate char, colitis, drug abuse, fetal alcohol syndrome, psychiatric hospitalization, impulse control disorder, char, schizoaffective disorder, and seizures who presents to the Emergency Department for evaluation of seizures which happened 15 minutes ago. Pt was at this facility yesterday for similar complaints; pt had elbow pain from an unwitnessed seizure. Pt says he is compliant with Keppra but says that it is not effective. Symptoms are constant and moderate in severity. No mitigating or exacerbating factors reported. No associated symptoms reported. No prior Tx reported. Pt reports a PMHx of epilepsy. No further complaints or concerns at this time.       Arrival mode: EMS    PCP: No, Primary Doctor        Past Medical History:  Past Medical History:   Diagnosis Date    ADHD (attention deficit hyperactivity disorder)     Asthma     Bipolar 1 disorder with moderate char 10/26/2023    Bipolar disorder     Colitis     Drug abuse     Amphetamines and cannabis    Fetal alcohol syndrome     Foster child 2020    History of psychiatric hospitalization     Hx of psychiatric care     Impulse control disorder     Char     Psychiatric exam requested by authority     Psychiatric problem     Schizoaffective disorder     Seizures     Sleep " difficulties     Therapy        Past Surgical History:  No past surgical history on file.      Family History:  Family History   Family history unknown: Yes       Social History:  Social History     Tobacco Use    Smoking status: Every Day     Current packs/day: 0.50     Average packs/day: 0.5 packs/day for 7.0 years (3.5 ttl pk-yrs)     Types: Cigarettes    Smokeless tobacco: Never   Substance and Sexual Activity    Alcohol use: No    Drug use: Not Currently    Sexual activity: Yes     Partners: Female     Birth control/protection: None        Review of Systems     Review of Systems   Constitutional:  Negative for fever.   HENT:  Negative for sore throat.    Respiratory:  Negative for shortness of breath.    Cardiovascular:  Negative for chest pain.   Gastrointestinal:  Negative for nausea.   Genitourinary:  Negative for dysuria.   Musculoskeletal:  Negative for back pain.   Skin:  Negative for rash.   Neurological:  Positive for seizures. Negative for weakness.   All other systems reviewed and are negative.     Physical Exam     Initial Vitals [12/24/23 1859]   BP Pulse Resp Temp SpO2   111/69 66 20 97.2 °F (36.2 °C) 99 %      MAP       --          Physical Exam  Nursing Notes and Vital Signs Reviewed.  Constitutional: Patient is in no acute distress. Well-developed and well-nourished.  Head: Atraumatic. Normocephalic.  Eyes:  EOM intact.  No scleral icterus.  ENT: Mucous membranes are moist.  Nares clear   Neck:  Full ROM. No JVD.  Cardiovascular: Regular rate. Regular rhythm No murmurs, rubs, or gallops. Distal pulses are 2+ and symmetric  Pulmonary/Chest: No respiratory distress. Clear to auscultation bilaterally. No wheezing or rales.  Equal chest wall rise bilaterally  Abdominal: Soft and non-distended.  There is no tenderness.  No rebound, guarding, or rigidity. Good bowel sounds.  Genitourinary: No CVA tenderness.  No suprapubic tenderness  Musculoskeletal: Moves all extremities. No obvious deformities.  5  "x 5 strength in all extremities   Skin: Warm and dry.  Neurological:  Alert, awake, and appropriate.  Normal speech.  No acute focal neurological deficits are appreciated.  Two through 12 intact bilaterally.  Psychiatric: Normal affect. Good eye contact. Appropriate in content.     ED Course   Procedures  ED Vital Signs:  Vitals:    12/24/23 1859   BP: 111/69   Pulse: 66   Resp: 20   Temp: 97.2 °F (36.2 °C)   TempSrc: Oral   SpO2: 99%   Height: 6' 2" (1.88 m)       Abnormal Lab Results:  Labs Reviewed - No data to display     All Lab Results:  Results for orders placed or performed during the hospital encounter of 11/18/23   CBC Auto Differential   Result Value Ref Range    WBC 7.07 3.90 - 12.70 K/uL    RBC 4.26 (L) 4.60 - 6.20 M/uL    Hemoglobin 12.6 12.5 - 16.3 g/dL    Hematocrit 38.0 36.7 - 47.1 %    MCV 89 82 - 98 fL    MCH 29.6 27.0 - 31.0 pg    MCHC 33.2 29.6 - 33.8 g/dL    RDW 12.9 11.5 - 14.5 %    Platelets 294 150 - 450 K/uL    MPV 10.2 9.2 - 12.9 fL    Immature Granulocytes 0.3 0.0 - 0.5 %    Gran # (ANC) 4.1 1.8 - 7.7 K/uL    Immature Grans (Abs) 0.02 0.00 - 0.04 K/uL    Lymph # 2.0 1.0 - 4.8 K/uL    Mono # 0.8 0.3 - 1.0 K/uL    Eos # 0.1 0.0 - 0.5 K/uL    Baso # 0.02 0.00 - 0.20 K/uL    nRBC 0 0 /100 WBC    Gran % 58.5 38.0 - 73.0 %    Lymph % 28.3 18.0 - 48.0 %    Mono % 11.0 4.0 - 15.0 %    Eosinophil % 1.6 0.0 - 8.0 %    Basophil % 0.3 0.0 - 1.9 %    Differential Method Automated    Comprehensive metabolic panel   Result Value Ref Range    Sodium 141 136 - 145 mmol/L    Potassium 3.6 3.5 - 5.1 mmol/L    Chloride 108 95 - 110 mmol/L    CO2 26 23 - 29 mmol/L    Glucose 83 70 - 110 mg/dL    BUN 10 6 - 20 mg/dL    Creatinine 0.7 0.5 - 1.4 mg/dL    Calcium 8.6 (L) 8.7 - 10.5 mg/dL    Total Protein 6.7 6.0 - 8.4 g/dL    Albumin 3.8 3.5 - 5.2 g/dL    Total Bilirubin 0.3 0.1 - 1.0 mg/dL    Alkaline Phosphatase 73 55 - 135 U/L    AST 21 10 - 40 U/L    ALT 12 10 - 44 U/L    eGFR >60.0 >60 mL/min/1.73 m^2    " Anion Gap 7 (L) 8 - 16 mmol/L   Lipid Panel   Result Value Ref Range    Cholesterol 120 120 - 199 mg/dL    Triglycerides 59 30 - 150 mg/dL    HDL 39 (L) 40 - 75 mg/dL    LDL Cholesterol 72.0 63.0 - 159.0 mg/dL    HDL/Cholesterol Ratio 32.5 20.0 - 50.0 %    Total Cholesterol/HDL Ratio 3.1 2.0 - 5.0    Non-HDL Cholesterol 3 mg/dL   CK   Result Value Ref Range     (H) 20 - 200 U/L   Ethanol   Result Value Ref Range    Alcohol, Serum <10 <10 mg/dL   Urinalysis, Reflex to Urine Culture Urine, Clean Catch    Specimen: Urine   Result Value Ref Range    Specimen UA Urine, Clean Catch     Color, UA Yellow Yellow, Straw, Wendy    Appearance, UA Clear Clear    pH, UA 7.0 5.0 - 8.0    Specific Gravity, UA 1.025 1.005 - 1.030    Protein, UA Negative Negative    Glucose, UA Negative Negative    Ketones, UA Negative Negative    Bilirubin (UA) Negative Negative    Occult Blood UA Negative Negative    Nitrite, UA Negative Negative    Urobilinogen, UA 1.0 <2.0 EU/dL    Leukocytes, UA Negative Negative   Drug screen panel, emergency   Result Value Ref Range    Benzodiazepines Negative Negative    Methadone metabolites Negative Negative    Cocaine (Metab.) Negative Negative    Opiate Scrn, Ur Negative Negative    Barbiturate Screen, Ur Negative Negative    Amphetamine Screen, Ur Negative Negative    THC Negative Negative    Phencyclidine Negative Negative    Creatinine, Urine 234.0 23.0 - 375.0 mg/dL    Toxicology Information SEE COMMENT    Buprenorphine, Urine   Result Value Ref Range    BUPRENORPHINE Negative Negative         Imaging Results:  Imaging Results    None              The Emergency Provider reviewed the vital signs and test results, which are outlined above.     ED Discussion       8:03 PM: Reassessed pt at this time. Discussed with pt all pertinent ED information and results. Discussed pt dx and plan of tx. Gave pt all f/u and return to the ED instructions. All questions and concerns were addressed at this time.  Pt expresses understanding of information and instructions, and is comfortable with plan to discharge. Pt is stable for discharge.    I discussed with patient and/or family/caretaker that evaluation in the ED does not suggest any emergent or life threatening medical conditions requiring immediate intervention beyond what was provided in the ED, and I believe patient is safe for discharge.  Regardless, an unremarkable evaluation in the ED does not preclude the development or presence of a serious of life threatening condition. As such, patient was instructed to return immediately for any worsening or change in current symptoms.        Medical Decision Making  Differential diagnosis:  Seizure, seizure disorder, pseudoseizures    Patient with a history of seizure disorder currently on Vimpat and Keppra.  Received Ativan prior to arrival has had recent increase in his seizure activity.  There is some question as pseudoseizures as well in his history patient was been loaded with Keppra in his had no further issues here.  He will be returned to [facility] for further treatment.  I did discuss it with Dr. Naranjo at a [facility].  He is aware of the patient's condition    Amount and/or Complexity of Data Reviewed  Discussion of management or test interpretation with external provider(s): Discussed the case with Dr. Naranjo at [facility]    Risk  Prescription drug management.  Decision regarding hospitalization.  Diagnosis or treatment significantly limited by social determinants of health.  Risk Details: Patient currently [place] [facility]                ED Medication(s):  Medications   levETIRAcetam in NaCl (iso-os) IVPB 1,000 mg (1,000 mg Intravenous New Bag 12/24/23 1936)       New Prescriptions    LEVETIRACETAM (KEPPRA) 1000 MG TABLET    Take 1 tablet (1,000 mg total) by mouth 2 (two) times daily.        Follow-up Information       Your Neurologist In 3 days.                                 Scribe Attestation:   Scribe #1: I performed the  above scribed service and the documentation accurately describes the services I performed. I attest to the accuracy of the note.     Attending:   Physician Attestation Statement for Scribe #1: I, Stanley Etienne Jr., MD, personally performed the services described in this documentation, as scribed by Sheree Jackson, in my presence, and it is both accurate and complete.           Clinical Impression       ICD-10-CM ICD-9-CM   1. Breakthrough seizure  G40.919 345.91       Disposition:   Disposition: Discharged  Condition: Stable         Stanley Etienne Jr., MD  12/24/23 2153       Stanley Etienne Jr., MD  12/24/23 2226

## 2024-04-30 ENCOUNTER — HOSPITAL ENCOUNTER (EMERGENCY)
Facility: HOSPITAL | Age: 24
Discharge: PSYCHIATRIC OR SUBSTANCE USE FACILITY | End: 2024-05-01
Attending: STUDENT IN AN ORGANIZED HEALTH CARE EDUCATION/TRAINING PROGRAM
Payer: MEDICAID

## 2024-04-30 VITALS
SYSTOLIC BLOOD PRESSURE: 120 MMHG | HEIGHT: 74 IN | WEIGHT: 150 LBS | OXYGEN SATURATION: 97 % | HEART RATE: 100 BPM | RESPIRATION RATE: 17 BRPM | BODY MASS INDEX: 19.25 KG/M2 | DIASTOLIC BLOOD PRESSURE: 70 MMHG | TEMPERATURE: 98 F

## 2024-04-30 DIAGNOSIS — F32.A DEPRESSION WITH SUICIDAL IDEATION: ICD-10-CM

## 2024-04-30 DIAGNOSIS — R45.851 SUICIDAL IDEATION: Primary | ICD-10-CM

## 2024-04-30 DIAGNOSIS — R45.851 DEPRESSION WITH SUICIDAL IDEATION: ICD-10-CM

## 2024-04-30 DIAGNOSIS — Z00.8 MEDICAL CLEARANCE FOR PSYCHIATRIC ADMISSION: ICD-10-CM

## 2024-04-30 LAB
ALBUMIN SERPL-MCNC: 4.1 G/DL (ref 3.5–5)
ALBUMIN/GLOB SERPL: 1.2 RATIO (ref 1.1–2)
ALP SERPL-CCNC: 75 UNIT/L (ref 40–150)
ALT SERPL-CCNC: 10 UNIT/L (ref 0–55)
AMPHET UR QL SCN: NEGATIVE
APAP SERPL-MCNC: <3 UG/ML (ref 10–30)
APPEARANCE UR: CLEAR
AST SERPL-CCNC: 17 UNIT/L (ref 5–34)
BACTERIA #/AREA URNS AUTO: ABNORMAL /HPF
BARBITURATE SCN PRESENT UR: NEGATIVE
BASOPHILS # BLD AUTO: 0.03 X10(3)/MCL
BASOPHILS NFR BLD AUTO: 0.2 %
BENZODIAZ UR QL SCN: NEGATIVE
BILIRUB SERPL-MCNC: 0.6 MG/DL
BILIRUB UR QL STRIP.AUTO: NEGATIVE
BUN SERPL-MCNC: 18.1 MG/DL (ref 8.9–20.6)
CALCIUM SERPL-MCNC: 9.2 MG/DL (ref 8.4–10.2)
CANNABINOIDS UR QL SCN: POSITIVE
CHLORIDE SERPL-SCNC: 107 MMOL/L (ref 98–107)
CO2 SERPL-SCNC: 24 MMOL/L (ref 22–29)
COCAINE UR QL SCN: NEGATIVE
COLOR UR AUTO: YELLOW
CREAT SERPL-MCNC: 0.8 MG/DL (ref 0.73–1.18)
EOSINOPHIL # BLD AUTO: 0.05 X10(3)/MCL (ref 0–0.9)
EOSINOPHIL NFR BLD AUTO: 0.4 %
ERYTHROCYTE [DISTWIDTH] IN BLOOD BY AUTOMATED COUNT: 13.3 % (ref 11.5–17)
ETHANOL SERPL-MCNC: <10 MG/DL
FENTANYL UR QL SCN: NEGATIVE
GFR SERPLBLD CREATININE-BSD FMLA CKD-EPI: >60 MLS/MIN/1.73/M2
GLOBULIN SER-MCNC: 3.5 GM/DL (ref 2.4–3.5)
GLUCOSE SERPL-MCNC: 84 MG/DL (ref 74–100)
GLUCOSE UR QL STRIP.AUTO: NORMAL
HCT VFR BLD AUTO: 37.5 % (ref 42–52)
HGB BLD-MCNC: 12.4 G/DL (ref 14–18)
IMM GRANULOCYTES # BLD AUTO: 0.04 X10(3)/MCL (ref 0–0.04)
IMM GRANULOCYTES NFR BLD AUTO: 0.3 %
KETONES UR QL STRIP.AUTO: ABNORMAL
LEUKOCYTE ESTERASE UR QL STRIP.AUTO: NEGATIVE
LYMPHOCYTES # BLD AUTO: 1.44 X10(3)/MCL (ref 0.6–4.6)
LYMPHOCYTES NFR BLD AUTO: 10.5 %
MCH RBC QN AUTO: 29.9 PG (ref 27–31)
MCHC RBC AUTO-ENTMCNC: 33.1 G/DL (ref 33–36)
MCV RBC AUTO: 90.4 FL (ref 80–94)
MDMA UR QL SCN: NEGATIVE
MONOCYTES # BLD AUTO: 0.99 X10(3)/MCL (ref 0.1–1.3)
MONOCYTES NFR BLD AUTO: 7.2 %
MUCOUS THREADS URNS QL MICRO: ABNORMAL /LPF
NEUTROPHILS # BLD AUTO: 11.18 X10(3)/MCL (ref 2.1–9.2)
NEUTROPHILS NFR BLD AUTO: 81.4 %
NITRITE UR QL STRIP.AUTO: NEGATIVE
NRBC BLD AUTO-RTO: 0 %
OPIATES UR QL SCN: NEGATIVE
PCP UR QL: NEGATIVE
PH UR STRIP.AUTO: 6 [PH]
PH UR: 6 [PH] (ref 3–11)
PLATELET # BLD AUTO: 215 X10(3)/MCL (ref 130–400)
PMV BLD AUTO: 10.5 FL (ref 7.4–10.4)
POTASSIUM SERPL-SCNC: 4 MMOL/L (ref 3.5–5.1)
PROT SERPL-MCNC: 7.6 GM/DL (ref 6.4–8.3)
PROT UR QL STRIP.AUTO: ABNORMAL
RBC # BLD AUTO: 4.15 X10(6)/MCL (ref 4.7–6.1)
RBC #/AREA URNS AUTO: ABNORMAL /HPF
RBC UR QL AUTO: NEGATIVE
SARS-COV-2 RDRP RESP QL NAA+PROBE: NEGATIVE
SODIUM SERPL-SCNC: 141 MMOL/L (ref 136–145)
SP GR UR STRIP.AUTO: 1.04 (ref 1–1.03)
SPECIFIC GRAVITY, URINE AUTO (.000) (OHS): >=1.03 (ref 1–1.03)
SQUAMOUS #/AREA URNS LPF: ABNORMAL /HPF
TSH SERPL-ACNC: 0.41 UIU/ML (ref 0.35–4.94)
UROBILINOGEN UR STRIP-ACNC: 2
WBC # SPEC AUTO: 13.73 X10(3)/MCL (ref 4.5–11.5)
WBC #/AREA URNS AUTO: ABNORMAL /HPF

## 2024-04-30 PROCEDURE — 81001 URINALYSIS AUTO W/SCOPE: CPT | Mod: XB | Performed by: STUDENT IN AN ORGANIZED HEALTH CARE EDUCATION/TRAINING PROGRAM

## 2024-04-30 PROCEDURE — 87635 SARS-COV-2 COVID-19 AMP PRB: CPT | Performed by: PHYSICIAN ASSISTANT

## 2024-04-30 PROCEDURE — 25000003 PHARM REV CODE 250: Performed by: STUDENT IN AN ORGANIZED HEALTH CARE EDUCATION/TRAINING PROGRAM

## 2024-04-30 PROCEDURE — 80307 DRUG TEST PRSMV CHEM ANLYZR: CPT | Performed by: PHYSICIAN ASSISTANT

## 2024-04-30 PROCEDURE — 85025 COMPLETE CBC W/AUTO DIFF WBC: CPT | Performed by: PHYSICIAN ASSISTANT

## 2024-04-30 PROCEDURE — 80053 COMPREHEN METABOLIC PANEL: CPT | Performed by: PHYSICIAN ASSISTANT

## 2024-04-30 PROCEDURE — 84443 ASSAY THYROID STIM HORMONE: CPT | Performed by: STUDENT IN AN ORGANIZED HEALTH CARE EDUCATION/TRAINING PROGRAM

## 2024-04-30 PROCEDURE — 80143 DRUG ASSAY ACETAMINOPHEN: CPT | Performed by: STUDENT IN AN ORGANIZED HEALTH CARE EDUCATION/TRAINING PROGRAM

## 2024-04-30 PROCEDURE — 82077 ASSAY SPEC XCP UR&BREATH IA: CPT | Performed by: PHYSICIAN ASSISTANT

## 2024-04-30 PROCEDURE — 99285 EMERGENCY DEPT VISIT HI MDM: CPT

## 2024-04-30 RX ORDER — LEVETIRACETAM 500 MG/1
1000 TABLET ORAL 2 TIMES DAILY
Status: DISCONTINUED | OUTPATIENT
Start: 2024-04-30 | End: 2024-05-01 | Stop reason: HOSPADM

## 2024-04-30 RX ORDER — LACOSAMIDE 50 MG/1
50 TABLET ORAL EVERY 12 HOURS
Status: DISCONTINUED | OUTPATIENT
Start: 2024-04-30 | End: 2024-05-01 | Stop reason: HOSPADM

## 2024-04-30 RX ADMIN — LACOSAMIDE 50 MG: 50 TABLET, FILM COATED ORAL at 09:04

## 2024-04-30 RX ADMIN — LEVETIRACETAM 1000 MG: 500 TABLET, FILM COATED ORAL at 09:04

## 2024-05-01 NOTE — FIRST PROVIDER EVALUATION
"Medical screening examination initiated.  I have conducted a focused provider triage encounter, findings are as follows:    Brief history of present illness:  23-year-old male presents to ED for evaluation of suicidal ideation.  Patient seen [facility] for previous arm injury where splint was applied    Vitals:    04/30/24 1933   BP: 120/70   Pulse: 100   Resp: 17   Temp: 98.2 °F (36.8 °C)   TempSrc: Oral   SpO2: 97%   Weight: 68 kg (150 lb)   Height: 6' 2" (1.88 m)       Pertinent physical exam:  Patient awake sitting on stretcher.  Left arm in splint.    Brief workup plan:  Psychiatric evaluation    Preliminary workup initiated; this workup will be continued and followed by the physician or advanced practice provider that is assigned to the patient when roomed.  " Gabapentin Counseling: I discussed with the patient the risks of gabapentin including but not limited to dizziness, somnolence, fatigue and ataxia.

## 2024-05-01 NOTE — ED PROVIDER NOTES
Encounter Date: 4/30/2024    SCRIBE #1 NOTE: I, Adrienne Ortiz, am scribing for, and in the presence of,  Jim Jackman MD. I have scribed the following portions of the note - Other sections scribed: HPI, ROS, PE.       History     Chief Complaint   Patient presents with    Suicidal     SI x 3 days. No specific plan. Denies HI. States wants to go inpt. Splint on L arm, states had accident last Saturday and was seen at [facility]. Denies pain, numbness or tingling to extremitity.      Patient is a 23 year old male with history of bipolar disorder, substance abuse, asthma, fetal alcohol syndrome, seizures, schizoaffective disorder, and psychiatric hospitalizations presents to the ED with complaints of SI. Pt reports that he has been depressed and having suicidal thoughts for the last couple of days, noting that he has been having nightmares that are worsening his symptoms. He states that he has not had his psychiatric medications since December. Pt notes that he cut his arm and tore some ligaments in it on Saturday and was seen at [facility]. He has an appointment with a hand surgeon on Friday.  Patient denies any fever, chest pain, shortness of breath, nausea, vomiting, worsening pain to splint, or any other concerns.    The history is provided by the patient. No  was used.     Review of patient's allergies indicates:   Allergen Reactions    Atomoxetine Other (See Comments)     Hx of hallucinations with Strattera    Aripiprazole Other (See Comments)     Other Reaction(s): Unknown    AV Hallucinations    Guanfacine     Mirtazapine     Oxcarbazepine     Phenytoin     Venom-honey bee     [place]hes[place] Rash    D and c red no.7 Rash    [place] Rash     Past Medical History:   Diagnosis Date    ADHD (attention deficit hyperactivity disorder)     Asthma     Bipolar 1 disorder with moderate char 10/26/2023    Bipolar disorder     Colitis     Drug abuse     Amphetamines and cannabis    Fetal  alcohol syndrome     Foster child 2020    History of psychiatric hospitalization     Hx of psychiatric care     Impulse control disorder     [place]     Psychiatric exam requested by authority     Psychiatric problem     Schizoaffective disorder     Seizures     Sleep difficulties     Therapy      No past surgical history on file.  Family History   Family history unknown: Yes     Social History     Tobacco Use    Smoking status: Every Day     Current packs/day: 0.50     Average packs/day: 0.5 packs/day for 7.0 years (3.5 ttl pk-yrs)     Types: Cigarettes    Smokeless tobacco: Never   Substance Use Topics    Alcohol use: No    Drug use: Not Currently     Review of Systems   Constitutional:  Negative for fever.   HENT:  Negative for sore throat.    Eyes:  Negative for visual disturbance.   Respiratory:  Negative for shortness of breath.    Cardiovascular:  Negative for chest pain.   Gastrointestinal:  Negative for abdominal pain.   Genitourinary:  Negative for dysuria.   Musculoskeletal:  Negative for joint swelling.   Skin:  Negative for rash.   Neurological:  Negative for weakness.   Psychiatric/Behavioral:  Positive for suicidal ideas. Negative for confusion.    All other systems reviewed and are negative.      Physical Exam     Initial Vitals [04/30/24 1933]   BP Pulse Resp Temp SpO2   120/70 100 17 98.2 °F (36.8 °C) 97 %      MAP       --         Physical Exam    Nursing note and vitals reviewed.  Constitutional: He appears well-developed and well-nourished. He is not diaphoretic. No distress.   HENT:   Head: Normocephalic and atraumatic.   Eyes: Conjunctivae and EOM are normal. Pupils are equal, round, and reactive to light.   Neck:   Normal range of motion.  Cardiovascular:  Normal rate, regular rhythm, normal heart sounds and intact distal pulses.           No murmur heard.  Pulmonary/Chest: Breath sounds normal. No respiratory distress. He has no wheezes. He has no rales.   Abdominal: Abdomen is soft. He  exhibits no distension. There is no abdominal tenderness.   Musculoskeletal:         General: Tenderness present. No edema.      Cervical back: Normal range of motion.      Comments: Splint to left wrist.  Full range of motion of digits 2 through 5.  Decreased abduction of the left thumb.  Adduction intact.  Splint in place limiting some range of motion.  Good cap refill.  No redness or swelling.     Neurological: He is alert and oriented to person, place, and time. No cranial nerve deficit.   Skin: Skin is warm and dry. Capillary refill takes less than 2 seconds. No rash noted. No erythema.   Psychiatric: He expresses suicidal ideation. He expresses no homicidal ideation. He expresses no suicidal plans and no homicidal plans.         ED Course   Procedures  Labs Reviewed   DRUG SCREEN, URINE (BEAKER) - Abnormal; Notable for the following components:       Result Value    Cannabinoids, Urine Positive (*)     All other components within normal limits    Narrative:     Cut off concentrations:    Amphetamines - 1000 ng/ml  Barbiturates - 200 ng/ml  Benzodiazepine - 200 ng/ml  Cannabinoids (THC) - 50 ng/ml  Cocaine - 300 ng/ml  Fentanyl - 1.0 ng/ml  MDMA - 500 ng/ml  Opiates - 300 ng/ml   Phencyclidine (PCP) - 25 ng/ml    Specimen submitted for drug analysis and tested for pH and specific gravity in order to evaluate sample integrity. Suspect tampering if specific gravity is <1.003 and/or pH is not within the range of 4.5 - 8.0  False negatives may result form substances such as bleach added to urine.  False positives may result for the presence of a substance with similar chemical structure to the drug or its metabolite.    This test provides only a PRELIMINARY analytical test result. A more specific alternate chemical method must be used in order to obtain a confirmed analytical result. Gas chromatography/mass spectrometry (GC/MS) is the preferred confirmatory method. Other chemical confirmation methods are available.  Clinical consideration and professional judgement should be applied to any drug of abuse test result, particularly when preliminary positive results are used.    Positive results will be confirmed only at the physicians request. Unconfirmed screening results are to be used only for medical purposes (treatment).        CBC WITH DIFFERENTIAL - Abnormal; Notable for the following components:    WBC 13.73 (*)     RBC 4.15 (*)     Hgb 12.4 (*)     Hct 37.5 (*)     MPV 10.5 (*)     Neut # 11.18 (*)     All other components within normal limits   SARS-COV-2 RNA AMPLIFICATION, QUAL - Normal    Narrative:     The IDNOW COVID-19 assay is a rapid molecular in vitro diagnostic test utilizing an isothermal nucleic acid amplification technology intended for the qualitative detection of nucleic acid from the SARS-CoV-2 viral RNA in direct nasal, nasopharyngeal or throat swabs from individuals who are suspected of COVID-19 by their healthcare provider.   CBC W/ AUTO DIFFERENTIAL    Narrative:     The following orders were created for panel order CBC auto differential.  Procedure                               Abnormality         Status                     ---------                               -----------         ------                     CBC with Differential[[id number]]       Abnormal            Final result                 Please view results for these tests on the individual orders.   COMPREHENSIVE METABOLIC PANEL   ALCOHOL,MEDICAL (ETHANOL)   TSH          Imaging Results    None          Medications   levETIRAcetam tablet 1,000 mg (has no administration in time range)   lacosamide tablet 50 mg (has no administration in time range)     Medical Decision Making  Problems Addressed:  Depression with suicidal ideation: acute illness or injury that poses a threat to life or bodily functions  Medical clearance for psychiatric admission: acute illness or injury that poses a threat to life or bodily functions  Suicidal ideation: acute  illness or injury that poses a threat to life or bodily functions    Amount and/or Complexity of Data Reviewed  Labs: ordered.    Risk  Prescription drug management.              Attending Attestation:           Physician Attestation for Scribe:  Physician Attestation Statement for Scribe #1: I, Jim Jackman MD, reviewed documentation, as scribed by Adrienne Ortiz in my presence, and it is both accurate and complete.                Medically cleared for psychiatry placement: 4/30/2024  8:52 PM       Medical Decision Making:   History:   Old Medical Records: I decided to obtain old medical records.  Old Records Summarized: other records, records from clinic visits, records from previous admission(s) and records from another hospital.       <> Summary of Records: Reviewed old records  Initial Assessment:   Psych eval  Differential Diagnosis:   suicidal ideations, homicidal ideations, auditory or visual hallucinations, drug/etoh intoxication, bipolar disorder, schizophrenia, schizoaffective, delusional disorder, major depressive disorder, PTSD, substance induced mood disorder, violent behavior, suicidal attempt, non-psychiatric medical illness, malingering      Clinical Tests:   Lab Tests: Ordered and Reviewed  ED Management:  Patient is a 23-year-old male who presents to emergency department for suicide ideation.  See HPI.  See physical exam.  Placed under pec.  Medically cleared hemodynamically stable for continued psychiatric evaluation.  He recently injured his left wrist and thumb, has a follow-up appointment with hand surgery on Friday.  All results discussed with the patient.  Medically cleared.  Answered all questions time.  Hemodynamically stable for transfer.             Clinical Impression:  Final diagnoses:  [R45.851] Suicidal ideation (Primary)  [F32.A, R45.851] Depression with suicidal ideation  [Z00.8] Medical clearance for psychiatric admission          ED Disposition Condition    Transfer to Psych  Facility Stable          ED Prescriptions    None       Follow-up Information    None          Jim Jackman MD  04/30/24 2105       Jim Jackman MD  04/30/24 2103

## 2024-05-31 ENCOUNTER — HOSPITAL ENCOUNTER (EMERGENCY)
Facility: HOSPITAL | Age: 24
Discharge: PSYCHIATRIC OR SUBSTANCE USE FACILITY | End: 2024-06-01
Attending: EMERGENCY MEDICINE
Payer: MEDICAID

## 2024-05-31 VITALS
DIASTOLIC BLOOD PRESSURE: 71 MMHG | RESPIRATION RATE: 18 BRPM | TEMPERATURE: 98 F | HEIGHT: 74 IN | SYSTOLIC BLOOD PRESSURE: 128 MMHG | OXYGEN SATURATION: 98 % | BODY MASS INDEX: 19.25 KG/M2 | WEIGHT: 150 LBS | HEART RATE: 74 BPM

## 2024-05-31 DIAGNOSIS — R45.851 DEPRESSION WITH SUICIDAL IDEATION: Primary | ICD-10-CM

## 2024-05-31 DIAGNOSIS — F32.A DEPRESSION WITH SUICIDAL IDEATION: Primary | ICD-10-CM

## 2024-05-31 LAB
ALBUMIN SERPL-MCNC: 4.2 G/DL (ref 3.5–5)
ALBUMIN/GLOB SERPL: 1.2 RATIO (ref 1.1–2)
ALP SERPL-CCNC: 75 UNIT/L (ref 40–150)
ALT SERPL-CCNC: 9 UNIT/L (ref 0–55)
ANION GAP SERPL CALC-SCNC: 8 MEQ/L
APAP SERPL-MCNC: <3 UG/ML (ref 10–30)
AST SERPL-CCNC: 16 UNIT/L (ref 5–34)
BACTERIA #/AREA URNS AUTO: ABNORMAL /HPF
BASOPHILS # BLD AUTO: 0.02 X10(3)/MCL
BASOPHILS NFR BLD AUTO: 0.3 %
BILIRUB SERPL-MCNC: 0.4 MG/DL
BILIRUB UR QL STRIP.AUTO: NEGATIVE
BUN SERPL-MCNC: 13.2 MG/DL (ref 8.9–20.6)
CALCIUM SERPL-MCNC: 9.3 MG/DL (ref 8.4–10.2)
CHLORIDE SERPL-SCNC: 109 MMOL/L (ref 98–107)
CLARITY UR: CLEAR
CO2 SERPL-SCNC: 27 MMOL/L (ref 22–29)
COLOR UR AUTO: YELLOW
CREAT SERPL-MCNC: 0.86 MG/DL (ref 0.73–1.18)
CREAT/UREA NIT SERPL: 15
EOSINOPHIL # BLD AUTO: 0.11 X10(3)/MCL (ref 0–0.9)
EOSINOPHIL NFR BLD AUTO: 1.6 %
ERYTHROCYTE [DISTWIDTH] IN BLOOD BY AUTOMATED COUNT: 12.9 % (ref 11.5–17)
ETHANOL SERPL-MCNC: <10 MG/DL
GFR SERPLBLD CREATININE-BSD FMLA CKD-EPI: >60 ML/MIN/1.73/M2
GLOBULIN SER-MCNC: 3.5 GM/DL (ref 2.4–3.5)
GLUCOSE SERPL-MCNC: 66 MG/DL (ref 74–100)
GLUCOSE UR QL STRIP: NORMAL
HCT VFR BLD AUTO: 39.5 % (ref 42–52)
HGB BLD-MCNC: 13.3 G/DL (ref 14–18)
HGB UR QL STRIP: NEGATIVE
IMM GRANULOCYTES # BLD AUTO: 0.02 X10(3)/MCL (ref 0–0.04)
IMM GRANULOCYTES NFR BLD AUTO: 0.3 %
KETONES UR QL STRIP: NEGATIVE
LEUKOCYTE ESTERASE UR QL STRIP: NEGATIVE
LYMPHOCYTES # BLD AUTO: 1.95 X10(3)/MCL (ref 0.6–4.6)
LYMPHOCYTES NFR BLD AUTO: 28.6 %
MCH RBC QN AUTO: 30 PG (ref 27–31)
MCHC RBC AUTO-ENTMCNC: 33.7 G/DL (ref 33–36)
MCV RBC AUTO: 89 FL (ref 80–94)
MONOCYTES # BLD AUTO: 0.79 X10(3)/MCL (ref 0.1–1.3)
MONOCYTES NFR BLD AUTO: 11.6 %
MUCOUS THREADS URNS QL MICRO: ABNORMAL /LPF
NEUTROPHILS # BLD AUTO: 3.94 X10(3)/MCL (ref 2.1–9.2)
NEUTROPHILS NFR BLD AUTO: 57.6 %
NITRITE UR QL STRIP: NEGATIVE
NRBC BLD AUTO-RTO: 0 %
PH UR STRIP: 6 [PH]
PLATELET # BLD AUTO: 232 X10(3)/MCL (ref 130–400)
PMV BLD AUTO: 10.2 FL (ref 7.4–10.4)
POTASSIUM SERPL-SCNC: 3.9 MMOL/L (ref 3.5–5.1)
PROT SERPL-MCNC: 7.7 GM/DL (ref 6.4–8.3)
PROT UR QL STRIP: ABNORMAL
RBC # BLD AUTO: 4.44 X10(6)/MCL (ref 4.7–6.1)
RBC #/AREA URNS AUTO: ABNORMAL /HPF
SODIUM SERPL-SCNC: 144 MMOL/L (ref 136–145)
SP GR UR STRIP.AUTO: 1.03 (ref 1–1.03)
SQUAMOUS #/AREA URNS LPF: ABNORMAL /HPF
UROBILINOGEN UR STRIP-ACNC: 2
WBC # SPEC AUTO: 6.83 X10(3)/MCL (ref 4.5–11.5)
WBC #/AREA URNS AUTO: ABNORMAL /HPF

## 2024-05-31 PROCEDURE — 80307 DRUG TEST PRSMV CHEM ANLYZR: CPT | Performed by: EMERGENCY MEDICINE

## 2024-05-31 PROCEDURE — 84443 ASSAY THYROID STIM HORMONE: CPT | Performed by: EMERGENCY MEDICINE

## 2024-05-31 PROCEDURE — U0002 COVID-19 LAB TEST NON-CDC: HCPCS | Performed by: EMERGENCY MEDICINE

## 2024-05-31 PROCEDURE — 80053 COMPREHEN METABOLIC PANEL: CPT | Performed by: EMERGENCY MEDICINE

## 2024-05-31 PROCEDURE — 80143 DRUG ASSAY ACETAMINOPHEN: CPT | Performed by: EMERGENCY MEDICINE

## 2024-05-31 PROCEDURE — 82077 ASSAY SPEC XCP UR&BREATH IA: CPT | Performed by: EMERGENCY MEDICINE

## 2024-05-31 PROCEDURE — 81001 URINALYSIS AUTO W/SCOPE: CPT | Performed by: EMERGENCY MEDICINE

## 2024-05-31 PROCEDURE — 99285 EMERGENCY DEPT VISIT HI MDM: CPT

## 2024-05-31 PROCEDURE — 85025 COMPLETE CBC W/AUTO DIFF WBC: CPT | Performed by: EMERGENCY MEDICINE

## 2024-06-01 LAB
AMPHET UR QL SCN: NEGATIVE
BARBITURATE SCN PRESENT UR: NEGATIVE
BENZODIAZ UR QL SCN: NEGATIVE
CANNABINOIDS UR QL SCN: POSITIVE
COCAINE UR QL SCN: NEGATIVE
FENTANYL UR QL SCN: NEGATIVE
MDMA UR QL SCN: NEGATIVE
OPIATES UR QL SCN: NEGATIVE
PCP UR QL: NEGATIVE
PH UR: 6 [PH] (ref 3–11)
SARS-COV-2 RDRP RESP QL NAA+PROBE: NEGATIVE
SPECIFIC GRAVITY, URINE AUTO (.000) (OHS): 1.03 (ref 1–1.03)
TSH SERPL-ACNC: 0.8 UIU/ML (ref 0.35–4.94)

## 2024-06-01 RX ORDER — LEVETIRACETAM 500 MG/1
1000 TABLET ORAL 2 TIMES DAILY
Status: DISCONTINUED | OUTPATIENT
Start: 2024-06-01 | End: 2024-06-01 | Stop reason: HOSPADM

## 2024-06-01 NOTE — ED PROVIDER NOTES
Encounter Date: 5/31/2024       History     Chief Complaint   Patient presents with    Suicidal     SI with plan to OD on his meds. Denies HI, hallucinations.      26-year-old male with a history of bipolar disorder, fetal alcohol syndrome, schizophrenia, ADHD presents to the emergency department for evaluation of depression with suicidal ideation with a plan to overdose on his home medications.  Reports history of previous suicide attempt, psychiatric hospitalization about 1 month ago.  Denies any intentional self-harm today.  Admits to drinking 1 alcoholic beverage earlier in the day    The history is provided by the patient and medical records.     Review of patient's allergies indicates:   Allergen Reactions    Atomoxetine Other (See Comments)     Hx of hallucinations with Strattera    Aripiprazole Other (See Comments)     Other Reaction(s): Unknown    AV Hallucinations    Guanfacine     Mirtazapine     Oxcarbazepine     Phenytoin     Venom-honey bee     Adhesive Rash    D and c red no.7 Rash    [place] Rash     Past Medical History:   Diagnosis Date    ADHD (attention deficit hyperactivity disorder)     Asthma     Bipolar 1 disorder with moderate angela 10/26/2023    Bipolar disorder     Colitis     Drug abuse     Amphetamines and cannabis    Fetal alcohol syndrome     Foster child 2020    History of psychiatric hospitalization     Hx of psychiatric care     Impulse control disorder     Angela     Psychiatric exam requested by authority     Psychiatric problem     Schizoaffective disorder     Seizures     Sleep difficulties     Therapy      No past surgical history on file.  Family History   Family history unknown: Yes     Social History     Tobacco Use    Smoking status: Every Day     Current packs/day: 0.50     Average packs/day: 0.5 packs/day for 7.0 years (3.5 ttl pk-yrs)     Types: Cigarettes    Smokeless tobacco: Never   Substance Use Topics    Alcohol use: No    Drug use: Not Currently     Review of  Systems   Constitutional:  Negative for fever.   Psychiatric/Behavioral:  Positive for suicidal ideas. Negative for self-injury.        Physical Exam     Initial Vitals [05/31/24 2248]   BP Pulse Resp Temp SpO2   128/71 74 18 98.2 °F (36.8 °C) 98 %      MAP       --         Physical Exam    Nursing note and vitals reviewed.  Constitutional: He appears well-developed and well-nourished. No distress.   HENT:   Head: Normocephalic and atraumatic.   Mouth/Throat: Oropharynx is clear and moist.   Eyes: No scleral icterus.   Neck: Neck supple.   Normal range of motion.  Cardiovascular:  Normal rate and regular rhythm.           Pulmonary/Chest: Breath sounds normal. No respiratory distress.   Abdominal: Abdomen is soft. He exhibits no distension. There is no abdominal tenderness.   Musculoskeletal:      Cervical back: Normal range of motion and neck supple.     Neurological: He is alert and oriented to person, place, and time. GCS score is 15.   Skin: Skin is warm and dry.   Psychiatric: He exhibits a depressed mood. He expresses no homicidal ideation. He expresses no homicidal plans.         ED Course   Procedures  Labs Reviewed   COMPREHENSIVE METABOLIC PANEL - Abnormal; Notable for the following components:       Result Value    Chloride 109 (*)     Glucose 66 (*)     All other components within normal limits   URINALYSIS, REFLEX TO URINE CULTURE - Abnormal; Notable for the following components:    Protein, UA Trace (*)     Urobilinogen, UA 2.0 (*)     Mucous, UA Moderate (*)     All other components within normal limits   ACETAMINOPHEN LEVEL - Abnormal; Notable for the following components:    Acetaminophen Level <3.0 (*)     All other components within normal limits   CBC WITH DIFFERENTIAL - Abnormal; Notable for the following components:    RBC 4.44 (*)     Hgb 13.3 (*)     Hct 39.5 (*)     All other components within normal limits   TSH - Normal   ALCOHOL,MEDICAL (ETHANOL) - Normal   CBC W/ AUTO DIFFERENTIAL     Narrative:     The following orders were created for panel order CBC auto differential.  Procedure                               Abnormality         Status                     ---------                               -----------         ------                     CBC with Differential[[id number]]       Abnormal            Final result                 Please view results for these tests on the individual orders.   DRUG SCREEN, URINE (BEAKER)   SARS-COV-2 RNA AMPLIFICATION, QUAL          Imaging Results    None          Medications - No data to display  Medical Decision Making  Problems Addressed:  Depression with suicidal ideation: acute illness or injury that poses a threat to life or bodily functions    Amount and/or Complexity of Data Reviewed  Labs: ordered.      ED assessment:    Mr. Middleton presented w/ depression with suicidal ideations, plan to overdose with reported previous suicide attempt.  Calm, cooperative on ED arrival.  Denied any intentional self-harm today.     Differential diagnosis (including but not limited to):   Depression with suicidal ideation, mood disorder, substance abuse, noncompliance with home medications    ED management:   Physician's Emergency certificate filed for danger to self given the patient's verbalized suicidal ideations with plan to overdose.  Psychiatric clearance protocol initiated with laboratory studies demonstrated no clinically significant acute abnormalities.  He is presently stable for transfer for inpatient psychiatric evaluation and treatment      Amount and/or Complexity of Data Reviewed  Independent historian: none   Summary of history:   External data reviewed: notes from previous ED visits and prescription medications   Summary of data reviewed:  Previous ED visits in May with breakthrough seizures.  Seen in late April with suicidal ideation.  Home medications include Vimpat, Keppra, Trileptal and Seroquel.  Risk and benefits of testing: discussed   Labs: ordered  and reviewed      Risk  Decision regarding transfer   Shared decision making     Critical Care  none    Radha AMADOR MD personally performed the history, PE, MDM, and procedures as documented above and agree with the scribe's documentation.              ED Course as of 06/01/24 0026   Sat Jun 01, 2024   0003 Laboratory studies with no clinically significant acute abnormalities that would preclude transfer for inpatient psychiatric evaluation and treat [KS]      ED Course User Index  [KS] Radha Giraldo MD       Medically cleared for psychiatry placement: 6/1/2024 12:23 AM                   Clinical Impression:  Final diagnoses:  [F32.A, R45.851] Depression with suicidal ideation (Primary)          ED Disposition Condition    Transfer to Psych Facility Stable          ED Prescriptions    None       Follow-up Information    None          Radha Giraldo MD  06/01/24 0026